# Patient Record
Sex: MALE | Race: OTHER | HISPANIC OR LATINO | Employment: OTHER | URBAN - METROPOLITAN AREA
[De-identification: names, ages, dates, MRNs, and addresses within clinical notes are randomized per-mention and may not be internally consistent; named-entity substitution may affect disease eponyms.]

---

## 2018-08-23 ENCOUNTER — APPOINTMENT (EMERGENCY)
Dept: RADIOLOGY | Facility: HOSPITAL | Age: 43
End: 2018-08-23
Payer: COMMERCIAL

## 2018-08-23 ENCOUNTER — HOSPITAL ENCOUNTER (OUTPATIENT)
Facility: HOSPITAL | Age: 43
Setting detail: OBSERVATION
Discharge: HOME | End: 2018-08-25
Attending: EMERGENCY MEDICINE | Admitting: SURGERY
Payer: COMMERCIAL

## 2018-08-23 DIAGNOSIS — S02.40DA CLOSED FRACTURE OF LEFT SIDE OF MAXILLA, INITIAL ENCOUNTER (HCC): Primary | ICD-10-CM

## 2018-08-23 DIAGNOSIS — S02.92XA CLOSED EXTENSIVE FACIAL FRACTURES (HCC): ICD-10-CM

## 2018-08-23 DIAGNOSIS — S02.40FA CLOSED FRACTURE OF LEFT ZYGOMATIC ARCH, INITIAL ENCOUNTER (HCC): ICD-10-CM

## 2018-08-23 DIAGNOSIS — S52.502A CLOSED FRACTURE OF DISTAL END OF LEFT RADIUS, UNSPECIFIED FRACTURE MORPHOLOGY, INITIAL ENCOUNTER: ICD-10-CM

## 2018-08-23 DIAGNOSIS — S62.90XA HAND FRACTURE: ICD-10-CM

## 2018-08-23 DIAGNOSIS — S62.309A CLOSED FRACTURE OF METACARPAL BONE, UNSPECIFIED FRACTURE MORPHOLOGY, UNSPECIFIED METACARPAL, UNSPECIFIED PORTION OF METACARPAL, INITIAL ENCOUNTER: ICD-10-CM
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
## 2018-08-23 DIAGNOSIS — S06.9X9A FRACTURE OF OCCIPITAL BONE OF SKULL WITH LOSS OF CONSCIOUSNESS (HCC): ICD-10-CM

## 2018-08-23 DIAGNOSIS — S02.119A FRACTURE OF OCCIPITAL BONE OF SKULL WITH LOSS OF CONSCIOUSNESS (HCC): ICD-10-CM

## 2018-08-23 LAB
ATRIAL RATE: 110 BPM
P AXIS: 64 DEGREES
PR INTERVAL: 172 MS
QRS AXIS: 66 DEGREES
QRSD INTERVAL: 84 MS
QT INTERVAL: 308 MS
QTC INTERVAL: 416 MS
T WAVE AXIS: 13 DEGREES
VENTRICULAR RATE: 110 BPM

## 2018-08-23 PROCEDURE — 71045 X-RAY EXAM CHEST 1 VIEW: CPT

## 2018-08-23 PROCEDURE — 93010 ELECTROCARDIOGRAM REPORT: CPT | Performed by: INTERNAL MEDICINE

## 2018-08-23 PROCEDURE — 96372 THER/PROPH/DIAG INJ SC/IM: CPT

## 2018-08-23 PROCEDURE — 99285 EMERGENCY DEPT VISIT HI MDM: CPT

## 2018-08-23 PROCEDURE — 93005 ELECTROCARDIOGRAM TRACING: CPT

## 2018-08-23 PROCEDURE — 72125 CT NECK SPINE W/O DYE: CPT

## 2018-08-23 PROCEDURE — 70486 CT MAXILLOFACIAL W/O DYE: CPT

## 2018-08-23 PROCEDURE — 73130 X-RAY EXAM OF HAND: CPT

## 2018-08-23 PROCEDURE — 70450 CT HEAD/BRAIN W/O DYE: CPT

## 2018-08-23 PROCEDURE — 73110 X-RAY EXAM OF WRIST: CPT

## 2018-08-23 PROCEDURE — 85049 AUTOMATED PLATELET COUNT: CPT | Performed by: ORTHOPAEDIC SURGERY

## 2018-08-23 PROCEDURE — 99219 PR INITIAL OBSERVATION CARE/DAY 50 MINUTES: CPT | Performed by: SURGERY

## 2018-08-23 PROCEDURE — 73564 X-RAY EXAM KNEE 4 OR MORE: CPT

## 2018-08-23 RX ORDER — DOCUSATE SODIUM 100 MG/1
100 CAPSULE, LIQUID FILLED ORAL 2 TIMES DAILY
Status: DISCONTINUED | OUTPATIENT
Start: 2018-08-23 | End: 2018-08-25 | Stop reason: HOSPADM

## 2018-08-23 RX ORDER — ACETAMINOPHEN 325 MG/1
650 TABLET ORAL EVERY 6 HOURS PRN
Status: DISCONTINUED | OUTPATIENT
Start: 2018-08-23 | End: 2018-08-24

## 2018-08-23 RX ORDER — KETOROLAC TROMETHAMINE 30 MG/ML
15 INJECTION, SOLUTION INTRAMUSCULAR; INTRAVENOUS ONCE
Status: COMPLETED | OUTPATIENT
Start: 2018-08-23 | End: 2018-08-23

## 2018-08-23 RX ORDER — HEPARIN SODIUM 5000 [USP'U]/ML
5000 INJECTION, SOLUTION INTRAVENOUS; SUBCUTANEOUS EVERY 8 HOURS SCHEDULED
Status: DISCONTINUED | OUTPATIENT
Start: 2018-08-23 | End: 2018-08-25 | Stop reason: HOSPADM

## 2018-08-23 RX ORDER — ONDANSETRON 2 MG/ML
4 INJECTION INTRAMUSCULAR; INTRAVENOUS EVERY 6 HOURS PRN
Status: DISCONTINUED | OUTPATIENT
Start: 2018-08-23 | End: 2018-08-25 | Stop reason: HOSPADM

## 2018-08-23 RX ORDER — DEXTROSE, SODIUM CHLORIDE, AND POTASSIUM CHLORIDE 5; .45; .15 G/100ML; G/100ML; G/100ML
100 INJECTION INTRAVENOUS CONTINUOUS
Status: DISCONTINUED | OUTPATIENT
Start: 2018-08-23 | End: 2018-08-25 | Stop reason: HOSPADM

## 2018-08-23 RX ORDER — OXYCODONE HYDROCHLORIDE 5 MG/1
5 TABLET ORAL EVERY 4 HOURS PRN
Status: DISCONTINUED | OUTPATIENT
Start: 2018-08-23 | End: 2018-08-25 | Stop reason: HOSPADM

## 2018-08-23 RX ADMIN — HEPARIN SODIUM 5000 UNITS: 5000 INJECTION, SOLUTION INTRAVENOUS; SUBCUTANEOUS at 22:00

## 2018-08-23 RX ADMIN — DOCUSATE SODIUM 100 MG: 100 CAPSULE, LIQUID FILLED ORAL at 20:38

## 2018-08-23 RX ADMIN — DEXTROSE, SODIUM CHLORIDE, AND POTASSIUM CHLORIDE 100 ML/HR: 5; .45; .15 INJECTION INTRAVENOUS at 20:41

## 2018-08-23 RX ADMIN — OXYCODONE HYDROCHLORIDE 5 MG: 5 TABLET ORAL at 20:38

## 2018-08-23 RX ADMIN — KETOROLAC TROMETHAMINE 15 MG: 30 INJECTION, SOLUTION INTRAMUSCULAR at 16:00

## 2018-08-23 NOTE — ED ATTENDING ATTESTATION
Lizzie Cardenas DO, saw and evaluated the patient  I have discussed the patient with the resident/non-physician practitioner and agree with the resident's/non-physician practitioner's findings, Plan of Care, and MDM as documented in the resident's/non-physician practitioner's note, except where noted  All available labs and Radiology studies were reviewed  At this point I agree with the current assessment done in the Emergency Department  I have conducted an independent evaluation of this patient a history and physical is as follows:    45 yo male BIBA s/p fall off ladder, c/o L wrist pain, hand pain, L sided facial pain and posterior HA  Unknown LOC  No blood thinners  Pain moderately severe, constant, no a/e factors  Denies radiation of pain, denies neck pain, focal weakness/numbness/tingling    Abrasion over L cheek  Deformity L hand/wrist     Will obtain CT head, Cspine, films L hand, wrist  Analgesia, reassess        Critical Care Time  CritCare Time    Procedures

## 2018-08-23 NOTE — ED PROVIDER NOTES
History  Chief Complaint   Patient presents with    Fall     Pt fell from ladder, 5ft up, landing on his L hand and stricking the left side of his head  Unknow LOC - thinners  c/o L wrist pain and unable to move left 3,4,5th fingers  52-year-old male presents to the emergency department for facial pain and left hand injury after a fall off of a ladder  The patient was on a 5 day for ladder painting when he fell onto his left hand  He is complaining of some pain in his left hand  He is discs located several fingers on that hand  He denies any numbness weakness or tingling in the hand  Additionally he hit the left side of his face on the pavement where he fell the he did lose consciousness  He has some left facial pain  He denies any numbness weakness tingling in his extremities  He has no neck pain  He denies any chest pain abdominal pain shortness of breath nausea or vomiting  He denies any visual changes  None       History reviewed  No pertinent past medical history  Past Surgical History:   Procedure Laterality Date    ORIF ZYGOMATIC FRACTURE Left 8/24/2018    Procedure: OPEN REDUCTION W/ INTERNAL FIXATION (ORIF) ZYGOMATIC FRACTURE;  Surgeon: Sallie Wolfe DDS;  Location: [facility];  Service: Maxillofacial       History reviewed  No pertinent family history  I have reviewed and agree with the history as documented  Social History   Substance Use Topics    Smoking status: Former Smoker    Smokeless tobacco: Never Used    Alcohol use 6 0 oz/week     10 Cans of beer per week      Comment: 2 days a month         Review of Systems   Constitutional: Negative for appetite change, chills, fatigue and fever  HENT: Positive for facial swelling  Negative for sneezing and sore throat  Eyes: Negative for visual disturbance  Respiratory: Negative for cough, choking, chest tightness, shortness of breath and wheezing  Cardiovascular: Negative for chest pain and palpitations  Gastrointestinal: Negative for abdominal pain, constipation, diarrhea, nausea and vomiting  Genitourinary: Negative for difficulty urinating and dysuria  Musculoskeletal: Positive for arthralgias  Neurological: Negative for dizziness, weakness, light-headedness, numbness and headaches  All other systems reviewed and are negative  Physical Exam  ED Triage Vitals [08/23/18 1513]   Temperature Pulse Respirations Blood Pressure SpO2   97 9 °F (36 6 °C) (!) 112 20 (!) 175/100 94 %      Temp Source Heart Rate Source Patient Position - Orthostatic VS BP Location FiO2 (%)   Oral Monitor Lying Right arm --      Pain Score       Worst Possible Pain           Orthostatic Vital Signs  Vitals:    08/24/18 1930 08/24/18 2320 08/25/18 0330 08/25/18 0735   BP: 152/79 141/81 132/72 108/63   Pulse: 99 79 71 80   Patient Position - Orthostatic VS: Lying Lying Lying Lying       Physical Exam   Constitutional: He is oriented to person, place, and time  He appears well-developed and well-nourished  No distress  HENT:   Head: Normocephalic  Mouth/Throat: Oropharynx is clear and moist    Eyes: EOM are normal  Pupils are equal, round, and reactive to light  Neck: No JVD present  No tracheal deviation present  Cardiovascular: Normal rate, regular rhythm, normal heart sounds and intact distal pulses  Exam reveals no gallop and no friction rub  No murmur heard  Pulmonary/Chest: Effort normal and breath sounds normal  No respiratory distress  He has no wheezes  He has no rales  Abdominal: Soft  Bowel sounds are normal  He exhibits no distension  There is no tenderness  There is no rebound and no guarding  Musculoskeletal:   Dislocation of the distal phalanx of the 2nd and 3rd fingers of the right hand  Neurological: He is alert and oriented to person, place, and time  No cranial nerve deficit  He exhibits normal muscle tone  Skin: Skin is warm and dry  He is not diaphoretic  No pallor     Psychiatric: He has a normal mood and affect  His behavior is normal    Nursing note and vitals reviewed  ED Medications  Medications   ketorolac (TORADOL) injection 15 mg (15 mg Intramuscular Given 8/23/18 1600)   benzocaine (HURRICAINE) 20 % mucosal spray 2 spray (2 sprays Mucosal Given 8/24/18 1930)       Diagnostic Studies  Results Reviewed     Procedure Component Value Units Date/Time    Platelet count [[id number]]  (Normal) Collected:  08/23/18 2349    Lab Status:  Final result Specimen:  Blood from Arm, Right Updated:  08/24/18 0029     Platelets 493 Thousands/uL      MPV 11 1 fL                  XR wrist 2 vw left   Final Result by Maria Eugenia Guerin MD (08/25 1013)      Fracture of the distal radius, fracture of the 2nd 3rd and 4th metacarpal with stable alignment            Workstation performed: [id number]         XR femur 2 vw left   Final Result by Maria Eugenia Guerin MD (08/25 1012)      No acute displaced fracture or dislocation seen            Workstation performed: [id number]         XR chest portable   Final Result by Isaura Taylor MD (08/24 3217)      No acute cardiopulmonary disease  Workstation performed: [id number]         XR knee 4+ vw left injury   Final Result by Isaura Taylor MD (08/24 3333)      No acute osseous abnormality  Workstation performed: [id number]         XR hand 3+ vw left   Final Result by Lana Munoz DO (08/24 0304)      Interval placement of overlying cast   Redemonstration of fractures of the distal radial metaphysis as well as the proximal 2nd, 3rd, and 4th metacarpals without significant displacement  Bone alignment is anatomic  Soft tissue swelling  Other findings as above  Workstation performed: [id number]         XR elbow 3+ vw left   Final Result by Lana Munoz DO (08/24 0258)      Overlying cast is partially visualized; no acute osseous abnormality is seen              Workstation performed: [id number]         CT head without contrast   Final Result by Inocencio Sierra MD (08/23 1615)   Addendum 1 of 1 by Inocencio Sierra MD (08/23 2205)   ADDENDUM:      In impression #2 of the original report, there is a speech recognition    error  The corrected impression should read as follows:      IMPRESSION:      1  No intracranial abnormality  2   Fractures of the left maxillary sinus and left zygomatic arch with    blood in the left maxillary sinus  I recommend further evaluation with CT    of the facial bones  3   Probable fracture of the left side of the occipital bone, limited to    the outer table with adjacent left occipital scalp hematoma  Final      1  No intracranial abnormality  2   Fractures of the left maxillary sinus and left side of the hepatic arch with blood in the left maxillary sinus  I recommend further evaluation with CT of the facial bones  3   Probable fracture of the left side of the occipital bone, limited to the outer table with adjacent left occipital scalp hematoma  Workstation performed: [id number]         XR wrist 3+ views LEFT   Final Result by Inocencio Sierra MD (08/23 2203)      Fractures of the left 2nd, 3rd and 4th metacarpals and distal left radius  Workstation performed: [id number]         CT facial bones without contrast   Final Result by Tish Christy MD (08/23 1821)      Left zygomaticomaxillary complex fracture  Workstation performed: [id number]         XR hand 3+ views LEFT   Final Result by Leonardo Jarrell MD (08/23 1629)      1  Fractures of the 2nd, 3rd and 4th metacarpals  2   Comminuted fracture of the distal radius  3  Dislocation of the 2nd proximal interphalangeal joint  Probable dislocation of the 2nd and 3rd distal interphalangeal joints  Evaluation of the digits is suboptimal due to flexion  Recommend reassessment on follow-up imaging  The study was marked in EPIC for significant notification        Workstation performed: [id number]         CT spine cervical wo contrast   Final Result by Kevin Da Silva MD (08/23 1622)       No cervical spine fracture or traumatic malalignment  Workstation performed: [id number]               Procedures  Procedures      Phone Consults  ED Phone Contact    ED Course                               MDM  Number of Diagnoses or Management Options  Closed extensive facial fractures [facility]):   Fall:   Fracture of occipital bone of skull with loss of consciousness [facility]):   Hand fracture:   Diagnosis management comments: 27-year-old male with left hand injury  The fingers are relocated at bedside without difficulty  The patient tolerated this well and the pain was greatly improved  Check x-ray of the hand, x-ray of the wrist, CT head C-spine  CritCare Time    Disposition  Final diagnoses:   Fall   Hand fracture   Closed extensive facial fractures ([facility] )   Fracture of occipital bone of skull with loss of consciousness ([facility] )     Time reflects when diagnosis was documented in both MDM as applicable and the Disposition within this note     Time User Action Codes Description Comment    8/23/2018  6:59 PM Susie Paget Add [S02 40DA] Closed fracture of left side of maxilla, initial encounter ([facility] )     8/23/2018  7:03 PM Susie Paget Add [S02 40FA] Closed fracture of left zygomatic arch, initial encounter ([facility] )     8/23/2018  7:05 PM Zhane Pageraquel Add [S62 309A] Closed fracture of metacarpal bone, unspecified fracture morphology, unspecified metacarpal, unspecified portion of metacarpal, initial encounter     8/23/2018  7:07 PM Susie Paget Add [S52 502A] Closed fracture of distal end of left radius, unspecified fracture morphology, initial encounter     8/23/2018  7:19 PM Alvaro Andrew Add [S21  XXXA] Fall     8/23/2018  7:19 PM Alvaro Andrew Add [S62 90XA] Hand fracture     8/23/2018  7:19 PM Nadeen Andrew Add [S02 92XA] Closed extensive facial fractures ([facility] )     8/23/2018  7:20 PM Lorrie Alvaro Artis [D21 000A,  D41 6J9Z] Fracture of occipital bone of skull with loss of consciousness [facility])       ED Disposition     ED Disposition Condition Comment    Admit  Case was discussed with Trauma and the patient's admission status was agreed to be Admission Status: inpatient status to the service of Dr Meggan De Jesus           Follow-up Information     Follow up With Specialties Details Why Contact Info    Emmanuelle Lundberg MD Orthopedic Surgery, Orthopedics Follow up in 1 week(s)  [address] [facility] [address]      Isaak Ramesh DDS Maxillofacial Surgery, Oral Surgery Schedule an appointment as soon as possible for a visit in 2 week(s) evaluate healing of left cheek bone/orbital fractures [address] [facility] [address]      Primary Care Provider  Schedule an appointment as soon as possible for a visit in 2 week(s)      Nat Jules MD Ophthalmology Schedule an appointment as soon as possible for a visit in 1 week(s)  [address]  [address] [address]  [phone]            Discharge Medication List as of 8/25/2018 10:30 AM      START taking these medications    Details   amoxicillin (AMOXIL) 500 mg capsule Take 1 capsule (500 mg total) by mouth every 12 (twelve) hours for 13 doses, Starting Sat 8/25/2018, Until Sat 9/1/2018, Print      chlorhexidine (PERIDEX) 0 12 % solution Apply 15 mL to the mouth or throat every 12 (twelve) hours, Starting Sat 8/25/2018, Print      guaiFENesin (MUCINEX) 600 mg 12 hr tablet Take 1 tablet (600 mg total) by mouth every 12 (twelve) hours for 27 doses, Starting Sat 8/25/2018, Until Sat 9/8/2018, Print      methocarbamol (ROBAXIN) 750 mg tablet Take 1 tablet (750 mg total) by mouth every 6 (six) hours for 23 doses, Starting Sat 8/25/2018, Until Fri 8/31/2018, Print      oxyCODONE (ROXICODONE) 5 mg immediate release tablet Take 1 tablet (5 mg total) by mouth every 4 (four) hours as needed for moderate pain for up to 10 days Max Daily Amount: 30 mg, Starting Sat 8/25/2018, Until Tue 9/4/2018, Normal             Outpatient Discharge Orders  Discharge Diet     Activity as tolerated     Lifting restrictions     No strenuous exercise     No driving until     May return to work/school on:     Call provider for:  persistent nausea or vomiting     Call provider for:  severe uncontrolled pain     Call provider for:  redness, tenderness, or signs of infection (pain, swelling, redness, odor or green/yellow discharge around incision site)     Call provider for:  difficulty breathing, headache or visual disturbances     Call provider for:  persistent dizziness or light-headedness         ED Provider  Attending physically available and evaluated Yamilexliss Vivian DAS managed the patient along with the ED Attending      Electronically Signed by         Monse Norman MD  08/27/18 6073

## 2018-08-23 NOTE — PROGRESS NOTES
Patient had a negative CT C-spine  On exam, patient was non-tender along C-spine and able to flex, extend, and rotate their head without pain  There were no distracting injuries and the patient was not intoxicated  The patients C-collar was able to be cleared      Rick Powell MD

## 2018-08-24 ENCOUNTER — ANESTHESIA EVENT (OUTPATIENT)
Dept: PERIOP | Facility: HOSPITAL | Age: 43
End: 2018-08-24
Payer: COMMERCIAL

## 2018-08-24 ENCOUNTER — APPOINTMENT (OUTPATIENT)
Dept: RADIOLOGY | Facility: HOSPITAL | Age: 43
End: 2018-08-24
Payer: COMMERCIAL

## 2018-08-24 ENCOUNTER — ANESTHESIA (OUTPATIENT)
Dept: PERIOP | Facility: HOSPITAL | Age: 43
End: 2018-08-24
Payer: COMMERCIAL

## 2018-08-24 PROBLEM — M25.562 ACUTE PAIN OF LEFT KNEE: Status: ACTIVE | Noted: 2018-08-24

## 2018-08-24 PROBLEM — S62.308A: Status: ACTIVE | Noted: 2018-08-24

## 2018-08-24 LAB
PLATELET # BLD AUTO: 247 THOUSANDS/UL (ref 149–390)
PMV BLD AUTO: 11.1 FL (ref 8.9–12.7)

## 2018-08-24 PROCEDURE — 99244 OFF/OP CNSLTJ NEW/EST MOD 40: CPT | Performed by: ORTHOPAEDIC SURGERY

## 2018-08-24 PROCEDURE — C1713 ANCHOR/SCREW BN/BN,TIS/BN: HCPCS | Performed by: DENTIST

## 2018-08-24 PROCEDURE — 73100 X-RAY EXAM OF WRIST: CPT

## 2018-08-24 PROCEDURE — 73552 X-RAY EXAM OF FEMUR 2/>: CPT

## 2018-08-24 PROCEDURE — 73080 X-RAY EXAM OF ELBOW: CPT

## 2018-08-24 PROCEDURE — 73130 X-RAY EXAM OF HAND: CPT

## 2018-08-24 DEVICE — TI MATRIXMIDFACE ORBITAL RIM PLATE/12 HOLES/0.5MM THICK
Type: IMPLANTABLE DEVICE | Site: ORBITAL FLOOR | Status: FUNCTIONAL
Brand: MATRIXMIDFACE

## 2018-08-24 DEVICE — TI MATRIXMIDFACE SCREW SELF-DRILLING 6MM
Type: IMPLANTABLE DEVICE | Site: FACE | Status: FUNCTIONAL
Brand: MATRIXMIDFACE

## 2018-08-24 DEVICE — TI MATRIXMIDFACE SCREW SELF-DRILLING 5MM
Type: IMPLANTABLE DEVICE | Site: ORBITAL FLOOR | Status: FUNCTIONAL
Brand: MATRIXMIDFACE

## 2018-08-24 RX ORDER — ACETAMINOPHEN 325 MG/1
975 TABLET ORAL EVERY 8 HOURS SCHEDULED
Status: DISCONTINUED | OUTPATIENT
Start: 2018-08-24 | End: 2018-08-25 | Stop reason: HOSPADM

## 2018-08-24 RX ORDER — ONDANSETRON 2 MG/ML
INJECTION INTRAMUSCULAR; INTRAVENOUS AS NEEDED
Status: DISCONTINUED | OUTPATIENT
Start: 2018-08-24 | End: 2018-08-24 | Stop reason: SURG

## 2018-08-24 RX ORDER — LIDOCAINE HYDROCHLORIDE AND EPINEPHRINE 10; 10 MG/ML; UG/ML
INJECTION, SOLUTION INFILTRATION; PERINEURAL AS NEEDED
Status: DISCONTINUED | OUTPATIENT
Start: 2018-08-24 | End: 2018-08-24 | Stop reason: HOSPADM

## 2018-08-24 RX ORDER — SUCCINYLCHOLINE CHLORIDE 20 MG/ML
INJECTION INTRAMUSCULAR; INTRAVENOUS AS NEEDED
Status: DISCONTINUED | OUTPATIENT
Start: 2018-08-24 | End: 2018-08-24 | Stop reason: SURG

## 2018-08-24 RX ORDER — BUPIVACAINE HYDROCHLORIDE AND EPINEPHRINE 5; 5 MG/ML; UG/ML
INJECTION, SOLUTION PERINEURAL AS NEEDED
Status: DISCONTINUED | OUTPATIENT
Start: 2018-08-24 | End: 2018-08-24 | Stop reason: HOSPADM

## 2018-08-24 RX ORDER — ONDANSETRON 2 MG/ML
4 INJECTION INTRAMUSCULAR; INTRAVENOUS EVERY 4 HOURS PRN
Status: DISCONTINUED | OUTPATIENT
Start: 2018-08-24 | End: 2018-08-24 | Stop reason: HOSPADM

## 2018-08-24 RX ORDER — KETOROLAC TROMETHAMINE 30 MG/ML
15 INJECTION, SOLUTION INTRAMUSCULAR; INTRAVENOUS EVERY 6 HOURS SCHEDULED
Status: DISCONTINUED | OUTPATIENT
Start: 2018-08-24 | End: 2018-08-25 | Stop reason: HOSPADM

## 2018-08-24 RX ORDER — METHOCARBAMOL 750 MG/1
750 TABLET, FILM COATED ORAL EVERY 6 HOURS SCHEDULED
Status: DISCONTINUED | OUTPATIENT
Start: 2018-08-24 | End: 2018-08-25 | Stop reason: HOSPADM

## 2018-08-24 RX ORDER — FENTANYL CITRATE 50 UG/ML
INJECTION, SOLUTION INTRAMUSCULAR; INTRAVENOUS AS NEEDED
Status: DISCONTINUED | OUTPATIENT
Start: 2018-08-24 | End: 2018-08-24 | Stop reason: SURG

## 2018-08-24 RX ORDER — PROPOFOL 10 MG/ML
INJECTION, EMULSION INTRAVENOUS AS NEEDED
Status: DISCONTINUED | OUTPATIENT
Start: 2018-08-24 | End: 2018-08-24 | Stop reason: SURG

## 2018-08-24 RX ORDER — BALANCED SALT SOLUTION 6.4; .75; .48; .3; 3.9; 1.7 MG/ML; MG/ML; MG/ML; MG/ML; MG/ML; MG/ML
SOLUTION OPHTHALMIC AS NEEDED
Status: DISCONTINUED | OUTPATIENT
Start: 2018-08-24 | End: 2018-08-24 | Stop reason: HOSPADM

## 2018-08-24 RX ORDER — SODIUM CHLORIDE, SODIUM LACTATE, POTASSIUM CHLORIDE, CALCIUM CHLORIDE 600; 310; 30; 20 MG/100ML; MG/100ML; MG/100ML; MG/100ML
50 INJECTION, SOLUTION INTRAVENOUS CONTINUOUS
Status: DISCONTINUED | OUTPATIENT
Start: 2018-08-24 | End: 2018-08-25 | Stop reason: HOSPADM

## 2018-08-24 RX ORDER — SENNOSIDES 8.6 MG
2 TABLET ORAL
Status: DISCONTINUED | OUTPATIENT
Start: 2018-08-24 | End: 2018-08-25 | Stop reason: HOSPADM

## 2018-08-24 RX ORDER — SODIUM CHLORIDE, SODIUM LACTATE, POTASSIUM CHLORIDE, CALCIUM CHLORIDE 600; 310; 30; 20 MG/100ML; MG/100ML; MG/100ML; MG/100ML
INJECTION, SOLUTION INTRAVENOUS CONTINUOUS PRN
Status: DISCONTINUED | OUTPATIENT
Start: 2018-08-24 | End: 2018-08-24 | Stop reason: SURG

## 2018-08-24 RX ORDER — OXYCODONE HYDROCHLORIDE 10 MG/1
10 TABLET ORAL EVERY 4 HOURS PRN
Status: DISCONTINUED | OUTPATIENT
Start: 2018-08-24 | End: 2018-08-25 | Stop reason: HOSPADM

## 2018-08-24 RX ORDER — FENTANYL CITRATE/PF 50 MCG/ML
25 SYRINGE (ML) INJECTION
Status: DISCONTINUED | OUTPATIENT
Start: 2018-08-24 | End: 2018-08-24 | Stop reason: HOSPADM

## 2018-08-24 RX ORDER — CHLORHEXIDINE GLUCONATE 0.12 MG/ML
15 RINSE ORAL EVERY 12 HOURS SCHEDULED
Status: DISCONTINUED | OUTPATIENT
Start: 2018-08-24 | End: 2018-08-25 | Stop reason: HOSPADM

## 2018-08-24 RX ADMIN — SUCCINYLCHOLINE CHLORIDE 200 MG: 20 INJECTION, SOLUTION INTRAMUSCULAR; INTRAVENOUS at 13:54

## 2018-08-24 RX ADMIN — OXYCODONE HYDROCHLORIDE 10 MG: 10 TABLET ORAL at 18:05

## 2018-08-24 RX ADMIN — DEXTROSE, SODIUM CHLORIDE, AND POTASSIUM CHLORIDE 100 ML/HR: 5; .45; .15 INJECTION INTRAVENOUS at 16:00

## 2018-08-24 RX ADMIN — DOCUSATE SODIUM 100 MG: 100 CAPSULE, LIQUID FILLED ORAL at 18:06

## 2018-08-24 RX ADMIN — ACETAMINOPHEN 975 MG: 325 TABLET, FILM COATED ORAL at 21:01

## 2018-08-24 RX ADMIN — KETOROLAC TROMETHAMINE 15 MG: 30 INJECTION, SOLUTION INTRAMUSCULAR at 18:09

## 2018-08-24 RX ADMIN — CEFAZOLIN SODIUM 2000 MG: 2 SOLUTION INTRAVENOUS at 14:02

## 2018-08-24 RX ADMIN — METHOCARBAMOL TABLETS 750 MG: 750 TABLET, COATED ORAL at 18:06

## 2018-08-24 RX ADMIN — DOCUSATE SODIUM 100 MG: 100 CAPSULE, LIQUID FILLED ORAL at 08:01

## 2018-08-24 RX ADMIN — DEXAMETHASONE SODIUM PHOSPHATE 10 MG: 10 INJECTION INTRAMUSCULAR; INTRAVENOUS at 14:08

## 2018-08-24 RX ADMIN — SODIUM CHLORIDE, SODIUM LACTATE, POTASSIUM CHLORIDE, AND CALCIUM CHLORIDE: .6; .31; .03; .02 INJECTION, SOLUTION INTRAVENOUS at 13:44

## 2018-08-24 RX ADMIN — KETOROLAC TROMETHAMINE 15 MG: 30 INJECTION, SOLUTION INTRAMUSCULAR at 07:47

## 2018-08-24 RX ADMIN — OXYCODONE HYDROCHLORIDE 5 MG: 5 TABLET ORAL at 03:58

## 2018-08-24 RX ADMIN — PROPOFOL 300 MG: 10 INJECTION, EMULSION INTRAVENOUS at 13:54

## 2018-08-24 RX ADMIN — CHLORHEXIDINE GLUCONATE 15 ML: 1.2 RINSE ORAL at 20:55

## 2018-08-24 RX ADMIN — METHOCARBAMOL TABLETS 750 MG: 750 TABLET, COATED ORAL at 11:21

## 2018-08-24 RX ADMIN — FENTANYL CITRATE 100 MCG: 50 INJECTION, SOLUTION INTRAMUSCULAR; INTRAVENOUS at 13:50

## 2018-08-24 RX ADMIN — TOPICAL ANESTHETIC 2 SPRAY: 200 SPRAY DENTAL; PERIODONTAL at 19:30

## 2018-08-24 RX ADMIN — ONDANSETRON 4 MG: 2 INJECTION INTRAMUSCULAR; INTRAVENOUS at 14:40

## 2018-08-24 RX ADMIN — SENNOSIDES 17.2 MG: 8.6 TABLET, FILM COATED ORAL at 21:02

## 2018-08-24 RX ADMIN — HEPARIN SODIUM 5000 UNITS: 5000 INJECTION, SOLUTION INTRAVENOUS; SUBCUTANEOUS at 21:00

## 2018-08-24 RX ADMIN — ACETAMINOPHEN 975 MG: 325 TABLET, FILM COATED ORAL at 07:46

## 2018-08-24 RX ADMIN — METHOCARBAMOL TABLETS 750 MG: 750 TABLET, COATED ORAL at 07:46

## 2018-08-24 NOTE — ANESTHESIA POSTPROCEDURE EVALUATION
Post-Op Assessment Note      CV Status:  Stable    Mental Status:  Alert and awake    Hydration Status:  Stable    PONV Controlled:  None    Airway Patency:  Patent    Post Op Vitals Reviewed: Yes          Staff: CRNA       Comments: hr 92, O2 sat 99%, bp 133/74, rr 14, temp 97          BP      Temp      Pulse     Resp      SpO2

## 2018-08-24 NOTE — OP NOTE
OPERATIVE REPORT  PATIENT NAME: Lena Estimable    :  1975  MRN: [id number]  Pt Location:  OR ROOM 05    SURGERY DATE: 2018    Surgeon(s) and Role: * Elle Callaway DMD - Co-surgeon     * Reema Sainz DDS - Primary    Preop Diagnosis:  Closed fracture of left side of maxilla, initial encounter ([facility] ) [S02 40DA]  Zygomatic fracture, left side, initial encounter for closed fracture [S02 40FA]  Closed fracture of lateral wall of orbit ([facility] ) [S02 80XA]    Post-Op Diagnosis Codes:     * Closed fracture of left side of maxilla, initial encounter ([facility] ) [S02 40DA]     * Zygomatic fracture, left side, initial encounter for closed fracture [S02 40FA]     * Closed fracture of lateral wall of orbit ([address] ) [S02 80XA]    Procedure(s) (LRB):  OPEN REDUCTION W/ INTERNAL FIXATION (ORIF) ZYGOMATIC FRACTURE (Left)    Specimen(s):  * No specimens in log *    Estimated Blood Loss:   Minimal    Drains:   None    Anesthesia Type:   General    Operative Indications:  Closed fracture of left side of maxilla, initial encounter ([facility] ) [S02 40DA]  Zygomatic fracture, left side, initial encounter for closed fracture [S02 40FA]  Closed fracture of lateral wall of orbit ([address] ) [S02 80XA]      Operative Findings:  Minimally displaced left zygomatic maxillary complex fractures    Complications:   None    Procedure and Technique:  Mr Baltazar Robles was greeted in the prep and hold area  All risk, benefits, alternative to procedure were discussed for open reduction internal fixation of left zygomatic maxillary complex fracture  Patient was given the opportunity to ask questions  All questions answered and patient confirmed previously signed informed consent  The patient was brought in the operating room, placed in a supine position on the operating room table, appropriate monitors placed and the anesthesia team and myself intubated patient endotracheally  The anesthesia team secured the endotracheal tube   A time out was performed with surgical, nursing, and anesthesia staff verifying patient procedure and laterality  The left face was prepared with Betadine solution and the patient was draped in the usual sterile fashion  Balanced salt solution was used to irrigate the left eye  A forced duction test was performed and extraocular movements were found to be intact  1% lidocaine 1:100,000 epinephrine was used to anesthetize left infraorbital nerve, left lateral brow skin along the planned incision site, the left posterior superior alveolar nerve, left greater palatine nerve  A 15 blade was used to make a left lateral brow incision through skin and subcutaneous tissues  A Bovie on 20:20 cut coagulation blend was used to incise the orbicularis oculi musculature and the periosteum  A periosteal elevator was used to reflect a full thickness flap and expose the fractured frontal-zygomatic suture  Manual reduction and placement of a Synthes curved plate with 5 mm screws was placed and secured Copious amounts of normal saline irrigation of the site was done  The incision was closed with 3-0 vertical mattress deep sutures and superficially with 6-0 fast gut continuous suture  Positive hemostasis was achieved  Attention was made intraorally  A Bovie on 20:20 cut coag blend was used to make a left vestibular incision through mucosa, submucosa and periosteum  A full thickness mucoperiosteal flap was reflected identifying the left piriform rim, the infraorbital nerve and the zygomatic buttress  The left zygomatic-maxillary buttress fracture was identified and manually reduced  A Synthes curved plate with 5 mm and 6 mm screws as placed and secure  Copious normal saline irrigation of the flap was done  Closure of the incisions was with 3-0 chromic gut continuous locking sutures  Positive hemostasis was achieved  The face was cleansed with normal saline solution  A forced duction test performed  Extraocular movements were intact   Balanced salt solution was used to irrigate the left eye  Sterile drapes were removed and the face cleansed with normal saline and dried  All sponge counts were correct with nursing staff  No complications encountered  I was present for the entire procedure  A qualified resident physician was not available and a co-surgeon was required because of the skills and techniques relevant to the specialty      Patient Disposition:  PACU  and extubated and stable    SIGNATURE: Efrain Myles DDS  DATE: August 24, 2018  TIME: 7:04 PM

## 2018-08-24 NOTE — PROGRESS NOTES
Progress Note - John Kay 1975, 44 y o  male MRN: [id number]    Unit/Bed#: CW3 335-01 Encounter: [id number]    Primary Care Provider: No primary care provider on file  Date and time admitted to hospital: 8/23/2018  3:09 PM        * Closed left maxillary fracture [facility])   Assessment & Plan    OMFS consulted  NPO for surgery  Pain management                Progress Note - Tertiary Trauma Survery   John Kay 44 y o  male MRN: [id number]  Unit/Bed#: CW3 335-01 Encounter: [id number]    Summary of Diagnosed Injuries: S/P Fall from ladder  Facial fractures  Left knee pain  Fractures to Left 2nd and 4th metacarpals  Left Occipital scalp hemtoma    Clinical Plan: Admit to trauma  ORtho consulted  OMFS consulted  Pain management  DVT prophylaxis  Therapy consulted    Mechanism of Injury: Fall    Transfer from: site of injury  Outside Films Received: no  Tertiary Exam Due on: 8/24/18    Vitals: Blood pressure 135/90, pulse 99, temperature 99 3 °F (37 4 °C), temperature source Oral, resp  rate 18, height 5' 6" (1 676 m), weight 95 kg (209 lb 7 oz), SpO2 98 %  ,Body mass index is 33 8 kg/m²  CT / RADIOGRAPHS: ALL RESULTS MUST BE CONFIRMED BY FACULTY OR PRINTED REPORT    CT HEAD: No intracranial abnormality  2   Fractures of the left maxillary sinus and left side of the hepatic arch with blood in the left maxillary sinus  I recommend further evaluation with CT of the facial bones    3   Probable fracture of the left side of the occipital bone, limited to the outer table with adjacent left occipital scalp hematoma        CT CHEST:    CT FACE: Left zygomaticomaxillary complex fracture     CT ABDOMEN / PELVIS:   CT CERVICAL SPINE:     No cervical spine fracture or traumatic malalignment     XR PELVIS:    CT THORACIC / LUMBAR SPINE:  CXR CHEST:    OTHER:left wrist -Fractures of the left 2nd, 3rd and 4th metacarpals and distal left radius      OTHER:    OTHER: Left hand - Interval placement of overlying cast   Redemonstration of fractures of the distal radial metaphysis as well as the proximal 2nd, 3rd, and 4th metacarpals without significant displacement  Bone alignment is anatomic      Soft tissue swelling      Other findings as above  OTHER: Left elbow - Overlying cast is partially visualized; no acute osseous abnormality is seen     OTHER: OTHER:    OTHER:  OTHER:    OTHER:  OTHER:      Consultants - List Service/ Faculty and Date: OMFS, Ortho, therapy    Active medications:           Current Facility-Administered Medications:     acetaminophen (TYLENOL) tablet 975 mg, 975 mg, Oral, Q8H [address], 975 mg at 08/24/18 0746    dextrose 5 % and sodium chloride 0 45 % with KCl 20 mEq/L infusion, 100 mL/hr, Intravenous, Continuous, 100 mL/hr at 08/23/18 2041    docusate sodium (COLACE) capsule 100 mg, 100 mg, Oral, BID, 100 mg at 08/23/18 2038    heparin (porcine) subcutaneous injection 5,000 Units, 5,000 Units, Subcutaneous, Q8H [address], 5,000 Units at 08/23/18 2200 **AND** Platelet count, , , Once    HYDROmorphone (DILAUDID) injection 1 mg, 1 mg, Intravenous, Q3H PRN    ketorolac (TORADOL) injection 15 mg, 15 mg, Intravenous, Q6H DEEP, 15 mg at 08/24/18 0747    methocarbamol (ROBAXIN) tablet 750 mg, 750 mg, Oral, Q6H DEEP, 750 mg at 08/24/18 0746    ondansetron (ZOFRAN) injection 4 mg, 4 mg, Intravenous, Q6H PRN    oxyCODONE (ROXICODONE) immediate release tablet 10 mg, 10 mg, Oral, Q4H PRN    oxyCODONE (ROXICODONE) IR tablet 5 mg, 5 mg, Oral, Q4H PRN, 5 mg at 08/24/18 0358    senna (SENOKOT) tablet 17 2 mg, 2 tablet, Oral, HS      Intake/Output Summary (Last 24 hours) at 08/24/18 0747  Last data filed at 08/24/18 6191   Gross per 24 hour   Intake                0 ml   Output              550 ml   Net             -550 ml       Invasive Devices     Peripheral Intravenous Line            Peripheral IV 08/23/18 Right Antecubital less than 1 day                CAGE-AID Questionnaire:    Was the patient able to participate in the CAGE-AID screening questions on admission? No:   1  Does the patient consume alcohol? a  NO-Patient does not consume alcohol     2  Does the patient use street drugs or abuse prescription drugs? a  NO-Patient does not use street drugs or abuse prescription drugs    Did the patient answer YES in one of the questions above? No            Is the patient 65 years or older: No    1  GCS:  GCS Total:  15, Eye Opening:   Spontaneous = 4, Motor Response: Obeys commands = 6 and Verbal Response:  Oriented = 5  2  Head:        -  normocephalic  3  Neck:        -  Non-tender, FROM  4  Chest:        -  Pain with deep inspiration       - pain over tib cage on right  5  Abdomen/Pelvis:        -  Soft, non-tender  6  Back (log roll with spinal immobilization unless cleared radiographically):        -  Non-tender  7  Extremities:        -  Sensation intact all four extremities        -  Left knee above joint on femur is tender to palpation        -  Pulses intact bilaterally  8  Peripheral Nerves:       -  Sensation intact    Do NOT use the following abbreviations: DTO, gr, Tai, MS, MSO4, MgSO4, Nitro, QD, QID, QOD, u, , ?, ?g or trailing zeros  Always use a zero before a decimal     Labs: Results for Magdy Khoury (MRN [id number]) as of 8/24/2018 07:38   Ref  Range 8/23/2018 23:49   MPV Latest Ref Range: 8 9 - 12 7 fL 11 1       Nurse / Provider rounds completed with staff nurse, Brionna Augustine and the patient  Pain regimen altered for better coverage    For OR today with OMFS

## 2018-08-24 NOTE — PROGRESS NOTES
Oral and Maxillofacial Surgery Note:    45 y/o M POD #0 s/p ORIF Left Zygomatic Maxillary Complex Fracture  Hemostatic  No complications  Left intraoral and left lateral brow incisions with resorbable sutures present      Recs:  -No further OMFS Surgical Intervention Planned   -Opthalmology c/s  -HOB 30 degrees  -Bacitracin to left lateral brow BID  -Sinus Precautions: no blowing nose, no bearing down forcefully, sneeze with mouth open  -No spitting, no straws, no suction  -Peridex 0 12% rinse BID  -Warm salt water rinses QID  -Ice to face 20 min on, 10 min off, up to 24 hours  -Diet: dental soft diet, advance to normal diet as tolerated  -Antibiotic: Amoxicillin 500 mg 1 po BID x 1 week  -Mucinex 600 mg 1 po BID x 2 week  -Follow up OMS outpatient office within 2 weeks    Tiana Wiggins DDS

## 2018-08-24 NOTE — PROGRESS NOTES
POST OP CHECK:    Patient doing well s/p ORIF Left Zygomatic Maxillary fracture  Patient says that his pain is ok  He rates it as a 5/10 in his face, throat and L arm  Denies nausea, vomiting, change in vision, or any other complaints  Vitas WNL  Physical Exam   Constitutional: He is oriented to person, place, and time  He appears well-developed and well-nourished  No distress  HENT:   Head: Normocephalic  Swelling to L side of face  Abrasion to L face    Eyes: Conjunctivae and EOM are normal  Pupils are equal, round, and reactive to light  Right eye exhibits no discharge  Left eye exhibits no discharge  Neck: Normal range of motion  Cardiovascular: Normal rate, regular rhythm, normal heart sounds and intact distal pulses  Exam reveals no gallop and no friction rub  No murmur heard  Pulmonary/Chest: Effort normal and breath sounds normal  No respiratory distress  Abdominal: Soft  Bowel sounds are normal  There is no tenderness  Musculoskeletal:   L arm in splint  Able to move fingers without difficulty     Neurological: He is alert and oriented to person, place, and time  No cranial nerve deficit or sensory deficit  He exhibits normal muscle tone  Skin: Skin is warm and dry  Psychiatric: He has a normal mood and affect  His behavior is normal    Nursing note and vitals reviewed

## 2018-08-24 NOTE — ANESTHESIA PREPROCEDURE EVALUATION
Physical Exam    Airway    Mallampati score: I  TM Distance: <3 FB  Neck ROM: full     Dental       Cardiovascular      Pulmonary      Other Findings        Anesthesia Plan  ASA Score- 1     Anesthesia Type- general with ASA Monitors  Additional Monitors:   Airway Plan:         Plan Factors-    Induction- intravenous  Postoperative Plan-     Informed Consent- Anesthetic plan and risks discussed with patient  I personally reviewed this patient with the CRNA  Discussed and agreed on the Anesthesia Plan with the CRNA  Mahin Patton

## 2018-08-24 NOTE — OCCUPATIONAL THERAPY NOTE
OCCUPATIONAL THERAPY CANCEL NOTE:    ORDERS RECEIVED  CHART REVIEW COMPLETED  PT CURRENTLY IN OR WITH OMFS  WILL FOLLOW-UP POST OP FOR COMPLETION OF OT JOSE Ford, TAURUS, OTR/L

## 2018-08-24 NOTE — DISCHARGE INSTRUCTIONS
Discharge Instructions - Orthopedics  John Kay 44 y o  male MRN: [id number]  Unit/Bed#: CW3 335-01    Weight Bearing Status:                                           Non weight bearing left arm    Pain:  Continue analgesics as directed    Dressing Instructions:   Keep dressing clean, dry and intact until follow up appointment  PT/OT:  Continue PT/OT on outpatient basis as directed    Appt Instructions: If you do not have your appointment, please call the clinic at [phone] to f/u with Dr Ruth Baker in 1 week      Contact the office sooner if you experience any increased numbness/tingling in the extremities        OMFS instruction  -HOB 30 degrees  -Bacitracin to left lateral brow BID  -Sinus Precautions: no blowing nose, no bearing down forcefully, sneeze with mouth open  -No spitting, no straws, no suction  -Peridex 0 12% rinse BID  -Warm salt water rinses QID  -Ice to face 20 min on, 10 min off, up to 24 hours  -Diet: dental soft diet, advance to normal diet as tolerated  -Antibiotic: Amoxicillin 500 mg 1 po BID x 1 week  -Mucinex 600 mg 1 po BID x 2 week  -Follow up OMS outpatient office within 2 weeks

## 2018-08-24 NOTE — ANESTHESIA PREPROCEDURE EVALUATION
Review of Systems/Medical History  Patient summary reviewed  Chart reviewed      Cardiovascular   Pulmonary       GI/Hepatic            Endo/Other     GYN       Hematology   Musculoskeletal       Neurology   Psychology           Physical Exam    Airway    Mallampati score: I  TM Distance: <3 FB  Neck ROM: full     Dental       Cardiovascular      Pulmonary      Other Findings        Anesthesia Plan  ASA Score- 1     Anesthesia Type-   Additional Monitors:   Airway Plan: ETT  Plan Factors-    Induction- intravenous  Postoperative Plan-     Informed Consent- Anesthetic plan and risks discussed with patient  I personally reviewed this patient with the CRNA  Discussed and agreed on the Anesthesia Plan with the CRNA  Augusto Romano

## 2018-08-24 NOTE — CONSULTS
Orthopedics   Juju Dy 44 y o  male MRN: [id number]  Unit/Bed#: CW3 335-01      Chief Complaint:   left wrist and hand pain    HPI:   44 y o  right hand dominant male status post Fall from ladder approximately 8 feet onto outstretched left hand with significant head trauma complaining of left wrist pain and hand pain  Pain is well localized, made worse with motion or contact to the area, and relieved by rest   Denies motor or sensory deficits to the affected hand  Denies any other major injuries, slightly tender over left lateral distal thigh proximal to knee  Review Of Systems:   · Skin: Normal  · Neuro: See HPI  · Musculoskeletal: See HPI  · 14 point review of systems negative except as stated above     Past Medical History:   History reviewed  No pertinent past medical history  Past Surgical History:   History reviewed  No pertinent surgical history  Family History:  Family history reviewed and non-contributory  History reviewed  No pertinent family history      Social History:  Social History     Social History    Marital status: /Civil Union     Spouse name: N/A    Number of children: N/A    Years of education: N/A     Social History Main Topics    Smoking status: Former Smoker    Smokeless tobacco: Never Used    Alcohol use 6 0 oz/week     10 Cans of beer per week      Comment: 2 days a month     Drug use: No      Comment: years a ago    Sexual activity: Not Asked     Other Topics Concern    None     Social History Narrative    None       Allergies:   No Known Allergies        Labs:  No results found for: HCT, HGB, PT, INR, WBC, ESR, CRP    Meds:    Current Facility-Administered Medications:     acetaminophen (TYLENOL) tablet 650 mg, 650 mg, Oral, Q6H PRN, Neela Dacosta MD    dextrose 5 % and sodium chloride 0 45 % with KCl 20 mEq/L infusion, 100 mL/hr, Intravenous, Continuous, Neela Dacosta MD, Last Rate: 100 mL/hr at 08/23/18 2041, 100 mL/hr at 08/23/18 2041    docusate sodium (COLACE) capsule 100 mg, 100 mg, Oral, BID, Demetra Oropeza MD, 100 mg at 08/23/18 2038    heparin (porcine) subcutaneous injection 5,000 Units, 5,000 Units, Subcutaneous, Q8H [address], 5,000 Units at 08/23/18 2200 **AND** Platelet count, , , Once, Demetra Oropeza MD    ondansetron [facility]) injection 4 mg, 4 mg, Intravenous, Q6H PRN, Demetra Oropeza MD    oxyCODONE (ROXICODONE) IR tablet 5 mg, 5 mg, Oral, Q4H PRN, Demetra Oropeza MD, 5 mg at 08/23/18 2038    Blood Culture:   No results found for: BLOODCX    Wound Culture:   No results found for: WOUNDCULT    Ins and Outs:  I/O last 24 hours: In: -   Out: 550 [Urine:550]          Physical Exam:   /91 (BP Location: Right arm)   Pulse 95   Temp 98 9 °F (37 2 °C) (Oral)   Resp 18   Ht 5' 6" (1 676 m)   Wt 92 4 kg (203 lb 11 3 oz)   SpO2 97%   BMI 32 88 kg/m²   Gen: Alert and oriented to person, place, time  HEENT: EOMI, eyes clear, moist mucus membranes, hearing intact  Respiratory: Bilateral chest rise  No audible wheezing found  Cardiovascular: Regular Rate and Rhythm  Abdomen: soft nontender/nondistended  Musculoskeletal: left upper extremity  · Skin intact  · No obvious deformity, moderate swelling about wrist  · Tender to palpation at 2nd, 3rd, 4th metacarpal  · Tender to palpation over forearm  · Sensation intact to median, radial, ulnar, distributions  · Motor intact to ain/pin/m/r/u  · Finger tips warm and well perfused 2+ radial pulse    Tertiary exam:  Slight tenderness noted left lateral distal thigh proximal to knee, x-ray of knee completed  No other sites of tenderness on tertiary exam    Radiology:   I personally reviewed the films    X-rays left wrist and hand shows distal radius fracture, index, long, ring finger metacarpal fractures  X-ray left hand show dislocation DIP and PIP joint of index finger, DIP dislocation of long finger    _*_*_*_*_*_*_*_*_*_*_*_*_*_*_*_*_*_*_*_*_*_*_*_*_*_*_*_*_*_*_*_*_*_*_*_*_*_*_*_*_*    Procedure: Distal radius splint application    Left hand examined him clinically noted to have relocation of DIP and PIP joints in all digits  After evaluation, pt was placed in a well padded sugartong splint  Pt tolerated the procedure well and was neurovascularly intact both pre and post procedure      Assessment:  44 y o male S/P fall from ladder with left distal radius fracture, index, long, ring finger metacarpal fractures, status post bedside reduction of PIP, and DIP dislocations of index finger and DIP dislocation of long finger    Plan:   · Non weight bearing left upper extremity in sugartong splint  · NPO at midnight  · Current plan for OR tomorrow per OMFS for facial fractures  · Ortho will follow, Will refine plan with orthopedic team    Edelmira Corral MD

## 2018-08-24 NOTE — CASE MANAGEMENT
Initial Clinical Review    Admission: Date/Time/Statement: Observation 8/23 @ 1910    Orders Placed This Encounter   Procedures    Place in Observation     Standing Status:   Standing     Number of Occurrences:   1     Order Specific Question:   Admitting Physician     Answer:   Sameul Her     Order Specific Question:   Level of Care     Answer:   Med Surg [16]       ED: Date/Time/Mode of Arrival:   ED Arrival Information     Expected Arrival Acuity Means of Arrival Escorted By Service Admission Type    - 8/23/2018 15:09 Emergent Ambulance [facility] Trauma Emergency    Arrival Complaint    Fall          Chief Complaint:   Chief Complaint   Patient presents with    Fall     Pt fell from ladder, 5ft up, landing on his L hand and stricking the left side of his head  Unknow LOC - thinners  c/o L wrist pain and unable to move left 3,4,5th fingers  History of Illness: 44 y o  male who presents as a trauma consult s/p fall from ladder  Patient fell onto his left side onto his hand and left face  Denied LOC  Patient came to the ED and imaging found fxs of the 2-4 metacarpals, comminuted fx of the distal radius, dislocation of the 2nd PIP and 2nd/3rd DIP joints, Left zygomaticomaxillary complex fx  Trauma was consulted to evaluate  Patient endorsed face and left hand/wrist pain  He also has some right chest tenderness and left knee tenderness      ED Vital Signs:   ED Triage Vitals [08/23/18 1513]   Temperature Pulse Respirations Blood Pressure SpO2   97 9 °F (36 6 °C) (!) 112 20 (!) 175/100 94 %      Temp Source Heart Rate Source Patient Position - Orthostatic VS BP Location FiO2 (%)   Oral Monitor Lying Right arm --      Pain Score       Worst Possible Pain        Wt Readings from Last 1 Encounters:   08/24/18 95 kg (209 lb 7 oz)       Vital Signs (abnormal):   08/23/18 1915  --   110  18  155/97  98 %  None (Room air)  --   08/23/18 1911  --   112  18  155/97  98 %  None (Room air)  Lying 08/23/18 1845  --   108  18  137/92  98 %  --  --   08/23/18 1802  --   110  18   163/104  98 %  None (Room air)         Abnormal Labs: WNL    Diagnostic Test Results: Xray Left Hand - 1  Fractures of the 2nd, 3rd and 4th metacarpals  2   Comminuted fracture of the distal radius  3  Dislocation of the 2nd proximal interphalangeal joint  Probable dislocation of the 2nd and 3rd distal interphalangeal joints  Evaluation of the digits is suboptimal due to flexion  Recommend reassessment on follow-up imaging      CT Facial Bones - Left zygomaticomaxillary complex fracture  ED Treatment:   Medication Administration from 08/23/2018 1508 to 08/23/2018 1953       Date/Time Order Dose Route Action     08/23/2018 1600 ketorolac (TORADOL) injection 15 mg 15 mg Intramuscular Given          Past Medical/Surgical History:    Active Ambulatory Problems     Diagnosis Date Noted    No Active Ambulatory Problems     Resolved Ambulatory Problems     Diagnosis Date Noted    No Resolved Ambulatory Problems     No Additional Past Medical History       Admitting Diagnosis: Injuries [T14 90XA]  Hand fracture [S62 90XA]  Closed extensive facial fractures ([address] ) [S02 92XA]  Fracture of occipital bone of skull with loss of consciousness ([address] ) [S02 119A, S06 9X9A]  Closed fracture of distal end of left radius, unspecified fracture morphology, initial encounter [S52 502A]  Closed fracture of metacarpal bone, unspecified fracture morphology, unspecified metacarpal, unspecified portion of metacarpal, initial encounter [S62 309A]  Closed fracture of left side of maxilla, initial encounter ([facility] [address] ) [S02 40DA]  Closed fracture of left zygomatic arch, initial encounter ([facility] [address] ) [S02 40FA]    Age/Sex: 44 y o  male    Assessment/Plan:   Trauma Alert: Evaluation  Model of Arrival: Self  Trauma Team: Attending Dr Lisseth Stout  Consultants: Trauma     Trauma Active Problems:   left and and wrist fxs, Left zygomaticomaxillary complex fx, lateral wall of the left orbit fx     Trauma Plan:   - Hand C/S  - OMFS C/S  - Pain PRN  - XR L Knee  - CXR      Admission Orders:  NPO  8/24 Tentative OR Today - OPEN REDUCTION W/ INTERNAL FIXATION (ORIF) ORBITAL (Left Face)    Oral and Maxillofacial Surgery cons  Orthopedic Surgery cons  PT/OT eval and treat    Scheduled Meds:   Current Facility-Administered Medications:  docusate sodium 100 mg Oral BID   heparin (porcine) 5,000 Units Subcutaneous Q8H [facility]     Continuous Infusions:   dextrose 5 % and sodium chloride 0 45 % with KCl 20 mEq/L 100 mL/hr Last Rate: 100 mL/hr (08/23/18 2041)     PRN Meds:   acetaminophen    ondansetron    oxyCODONE po x2    -------------------------------------------------------------------------------------------------    8/23 Oral and Maxillofacial Surgery cons:  44 y o  male s/p fall, left ZMC fracture     Plan/Recs:  OR tomorrow 7/24 for ORIF left ZMC fracture with left orbital floor reconstruction  Case boarded, consents obtained, questions answered  unasyn  peridex     ------------------------------------------------------------------------------------------    8/23 Orthopedic cons:  Assessment:  43 y o male S/P fall from ladder with left distal radius fracture, index, long, ring finger metacarpal fractures, status post bedside reduction of PIP, and DIP dislocations of index finger and DIP dislocation of long finger     Plan:   · Non weight bearing left upper extremity in sugartong splint  · NPO at midnight  · Current plan for OR tomorrow per OMFS for facial fractures  · Ortho will follow, Will refine plan with orthopedic team

## 2018-08-24 NOTE — CONSULTS
Patient Name: Immanuel Cali YOB: 1975    Medical Record No : [id number]     Admit/Registration Date: 8/23/2018  3:09 PM  Date of Consult: 08/23/18      Oral and Maxillofacial Surgery Consult Note    Assessment:  44 y o  male s/p fall, left ZMC fracture    Plan/Recs:  OR tomorrow 7/24 for ORIF left ZMC fracture with left orbital floor reconstruction  Case boarded, consents obtained, questions answered  unasyn  peridex     -----------------------------------------    Chief Complaint:  "I fell"    HPI:  44 y o  male who presents s/p fall from ladder  Denies LOC  Was scanned and noted to have a left ZMC fracture  Denies diplopia or changes to vision  Notes left V2 paresthesia  Denies malocclusion  Pre-admission records reviewed  PMH/PSH/Meds/Allergies Reviewed  History reviewed  No pertinent past medical history  History reviewed  No pertinent surgical history  No Known Allergies    Social History     Social History    Marital status: /Civil Union     Spouse name: N/A    Number of children: N/A    Years of education: N/A     Occupational History    Not on file       Social History Main Topics    Smoking status: Former Smoker    Smokeless tobacco: Never Used    Alcohol use 6 0 oz/week     10 Cans of beer per week      Comment: 2 days a month     Drug use: No      Comment: years a ago    Sexual activity: Not on file     Other Topics Concern    Not on file     Social History Narrative    No narrative on file       Scheduled Medications    Current Facility-Administered Medications:  acetaminophen 650 mg Oral Q6H PRN Christian Sanchez MD   dextrose 5 % and sodium chloride 0 45 % with KCl 20 mEq/L 100 mL/hr Intravenous Continuous Christian Sanchez MD   docusate sodium 100 mg Oral BID Christian Sanchez MD   heparin (porcine) 5,000 Units Subcutaneous [facility] Christian Sanchez MD   ondansetron 4 mg Intravenous Q6H PRN Christian Sanchez MD   oxyCODONE 5 mg Oral Q4H PRN Christian Sanchez MD       PRN Medications   acetaminophen    ondansetron    oxyCODONE    Medication Infusions    dextrose 5 % and sodium chloride 0 45 % with KCl 20 mEq/L 100 mL/hr       Review of Systems:   All systems reviewed and were negative except for: face pain     Vitals:    Temp:  [97 9 °F (36 6 °C)-98 9 °F (37 2 °C)] 98 9 °F (37 2 °C)  HR:  [] 95  Resp:  [18-20] 18  BP: (137-175)/() 161/91  Wt Readings from Last 1 Encounters:   08/23/18 92 4 kg (203 lb 11 3 oz)     Ht Readings from Last 1 Encounters:   08/23/18 5' 6" (1 676 m)     Body mass index is 32 88 kg/m²  Respiratory    Lab Data (Last 4 hours)    None         O2/Vent Data (Last 4 hours)    None              Patient Lines/Drains/Airways Status    Active Airway     None              I/O:  Current Diet Order:        Diet Orders            Start     Ordered    08/23/18 1910  Diet Regular; Regular House  Diet effective now     Question Answer Comment   Diet Type Regular    Regular Regular House    RD to adjust diet per protocol? Yes        08/23/18 1912           Intake/Output Summary (Last 24 hours) at 08/23/18 2017  Last data filed at 08/23/18 1750   Gross per 24 hour   Intake                0 ml   Output              550 ml   Net             -550 ml       Labs:                  Pain Management Panel     There is no flowsheet data to display          Imaging:   CT: reviewed, left ZMC fracture, left orbital floor fracture    Physical Exam:   General: Integment: Patient is WD/WN, Voice quality: wnl, in NAD, skin warm and dry   Neuro Exam: AAO x 3, CN V,VII grossly intact, GCS: 15  Head: Normocephalic, no scalp lacerations or hematoma   Face: No lacerations/Abrasions/Step Offs    Ears: Pinna wnl bilaterally, no otorrhea, hearing grossly intact   Eyes/Periorbital: Pupils equal, round, reactive to light and Extraocular movements intact, intercanthal distance wnl, globes soft   Nose: External nose symmetrical/no gross deformity, no nasal crepitus, no nasal septal hematoma, no rhinorrhea, no epistaxis, bilateral nares patent   Oral Exam:Lips and mucosal surfaces wnl, floor of mouth is soft with no palpable masses, tongue protrusion is midline and has full range of motion, no pharyngeal edema or exudate   Dentalalveolar Exam: Normal dentition, atraumatic and occlusion stable, JEFF wnl, lateral excursive movements wnl   Lymph/Neck Exam: Neck is soft, trachea is midline, no gross cervical lymphadenopathy bilaterally   Musculoskeletal: Neck with FROM  Cardiovascular: intact distal pulses   Respiratory: symmetric chest rise   Gastrointestinal: nondistended  Genitourinary: Defer   Extremities: No gross peripheral edema          Carrington Mayes, DMD

## 2018-08-24 NOTE — PHYSICAL THERAPY NOTE
Physical Therapy Cancellation Note        PT orders noted and chart reviewed  Plan for pt to leave unit for pending surgery today  Will continue to follow and evaluate as appropriate     Floyd Stark, PT,DPT

## 2018-08-24 NOTE — PROGRESS NOTES
Subjective: No acute events overnight  Resting upon entering room, but complains of ongoing left hand and wrist discomfort       Objective:  No results found for: WBC, HGB    Vitals:    08/24/18 0350   BP: 135/90   Pulse: 99   Resp: 18   Temp: 99 3 °F (37 4 °C)   SpO2: 98%     Left upper extremity  Splint in place, c/d/i  Motor & sensation grossly intact where visible, splinted past digits  fingertips wwp    Assessment: 43 yo M s/p fall from ladder with L distal radius fx, L index/long/ring finger metacarpal fx w/assoc multiple facial fx being managed per OMFS    Plan:  Plan for OR today with OMFS  Remain NWB GEORGIEE in splint  Pain control  PT  Ortho will follow

## 2018-08-24 NOTE — INTERIM OP NOTE
OPEN REDUCTION W/ INTERNAL FIXATION (ORIF) ZYGOMATIC FRACTURE  Postoperative Note  PATIENT NAME: Kaye Strauss  : 1975  MRN: [id number]  BE OR ROOM 05    Surgery Date: 2018    Preop Diagnosis:  Closed fracture of left side of maxilla, initial encounter ([facility] ) [S02 40DA]     * Zygomatic fracture, left side, initial encounter for closed fracture [S02 40FA]     * Closed fracture of lateral wall of orbit ([facility] ) [S02 80XA]    Post-Op Diagnosis Codes:     * Closed fracture of left side of maxilla, initial encounter ([facility] ) [S02 40DA]     * Zygomatic fracture, left side, initial encounter for closed fracture [S02 40FA]     * Closed fracture of lateral wall of orbit (HCC) [S02 80XA]    Procedure(s) (LRB):  OPEN REDUCTION W/ INTERNAL FIXATION (ORIF) ZYGOMATIC FRACTURE (Left)    Surgeon(s) and Role:      * Halie Nova DMD - Co-surgeon     * Isaak Ramesh DDS - Primary    Specimens:  * No specimens in log *    Estimated Blood Loss:   Minimal    Anesthesia Type:   General     Findings:   Minimally displaced left zygomatic maxillary complex fractures     Complications:   None    SIGNATURE: Isaak Ramesh DDS   DATE: 2018   TIME: 2:51 PM

## 2018-08-25 VITALS
RESPIRATION RATE: 16 BRPM | OXYGEN SATURATION: 97 % | HEART RATE: 80 BPM | HEIGHT: 66 IN | TEMPERATURE: 97.9 F | SYSTOLIC BLOOD PRESSURE: 108 MMHG | WEIGHT: 209.44 LBS | DIASTOLIC BLOOD PRESSURE: 63 MMHG | BODY MASS INDEX: 33.66 KG/M2

## 2018-08-25 PROCEDURE — G8989 SELF CARE D/C STATUS: HCPCS

## 2018-08-25 PROCEDURE — 99217 PR OBSERVATION CARE DISCHARGE MANAGEMENT: CPT | Performed by: SURGERY

## 2018-08-25 PROCEDURE — G8987 SELF CARE CURRENT STATUS: HCPCS

## 2018-08-25 PROCEDURE — G8988 SELF CARE GOAL STATUS: HCPCS

## 2018-08-25 PROCEDURE — G8979 MOBILITY GOAL STATUS: HCPCS

## 2018-08-25 PROCEDURE — 97167 OT EVAL HIGH COMPLEX 60 MIN: CPT

## 2018-08-25 PROCEDURE — 97163 PT EVAL HIGH COMPLEX 45 MIN: CPT

## 2018-08-25 PROCEDURE — G8978 MOBILITY CURRENT STATUS: HCPCS

## 2018-08-25 PROCEDURE — G8980 MOBILITY D/C STATUS: HCPCS

## 2018-08-25 RX ORDER — METHOCARBAMOL 750 MG/1
750 TABLET, FILM COATED ORAL EVERY 6 HOURS SCHEDULED
Qty: 23 TABLET | Refills: 0 | Status: SHIPPED | OUTPATIENT
Start: 2018-08-25 | End: 2018-08-30

## 2018-08-25 RX ORDER — OXYCODONE HYDROCHLORIDE 5 MG/1
5 TABLET ORAL EVERY 4 HOURS PRN
Qty: 20 TABLET | Refills: 0 | Status: SHIPPED | OUTPATIENT
Start: 2018-08-25 | End: 2018-09-04

## 2018-08-25 RX ORDER — AMOXICILLIN 500 MG/1
500 CAPSULE ORAL EVERY 12 HOURS SCHEDULED
Qty: 13 CAPSULE | Refills: 0 | Status: SHIPPED | OUTPATIENT
Start: 2018-08-25 | End: 2018-09-01

## 2018-08-25 RX ORDER — CHLORHEXIDINE GLUCONATE 0.12 MG/ML
15 RINSE ORAL EVERY 12 HOURS SCHEDULED
Qty: 120 ML | Refills: 0 | Status: SHIPPED | OUTPATIENT
Start: 2018-08-25 | End: 2018-09-17

## 2018-08-25 RX ORDER — GUAIFENESIN 600 MG
600 TABLET, EXTENDED RELEASE 12 HR ORAL EVERY 12 HOURS SCHEDULED
Qty: 27 TABLET | Refills: 0 | Status: SHIPPED | OUTPATIENT
Start: 2018-08-25 | End: 2018-08-30

## 2018-08-25 RX ORDER — GUAIFENESIN 600 MG
600 TABLET, EXTENDED RELEASE 12 HR ORAL EVERY 12 HOURS SCHEDULED
Status: DISCONTINUED | OUTPATIENT
Start: 2018-08-25 | End: 2018-08-25 | Stop reason: HOSPADM

## 2018-08-25 RX ORDER — AMOXICILLIN 500 MG/1
500 CAPSULE ORAL EVERY 12 HOURS SCHEDULED
Status: DISCONTINUED | OUTPATIENT
Start: 2018-08-25 | End: 2018-08-25 | Stop reason: HOSPADM

## 2018-08-25 RX ADMIN — KETOROLAC TROMETHAMINE 15 MG: 30 INJECTION, SOLUTION INTRAMUSCULAR at 05:27

## 2018-08-25 RX ADMIN — HEPARIN SODIUM 5000 UNITS: 5000 INJECTION, SOLUTION INTRAVENOUS; SUBCUTANEOUS at 05:27

## 2018-08-25 RX ADMIN — METHOCARBAMOL TABLETS 750 MG: 750 TABLET, COATED ORAL at 05:28

## 2018-08-25 RX ADMIN — GUAIFENESIN 600 MG: 600 TABLET, EXTENDED RELEASE ORAL at 09:04

## 2018-08-25 RX ADMIN — ACETAMINOPHEN 975 MG: 325 TABLET, FILM COATED ORAL at 05:28

## 2018-08-25 RX ADMIN — AMOXICILLIN 500 MG: 500 CAPSULE ORAL at 09:03

## 2018-08-25 RX ADMIN — METHOCARBAMOL TABLETS 750 MG: 750 TABLET, COATED ORAL at 00:04

## 2018-08-25 RX ADMIN — KETOROLAC TROMETHAMINE 15 MG: 30 INJECTION, SOLUTION INTRAMUSCULAR at 00:04

## 2018-08-25 RX ADMIN — SODIUM CHLORIDE, SODIUM LACTATE, POTASSIUM CHLORIDE, AND CALCIUM CHLORIDE 50 ML/HR: .6; .31; .03; .02 INJECTION, SOLUTION INTRAVENOUS at 02:16

## 2018-08-25 RX ADMIN — CHLORHEXIDINE GLUCONATE 15 ML: 1.2 RINSE ORAL at 09:04

## 2018-08-25 NOTE — ASSESSMENT & PLAN NOTE
-Orthopedics consulted, appreciate input  -neurovascular checks  - continue splint  - outpatient follow-up

## 2018-08-25 NOTE — PROGRESS NOTES
Progress Note - Reggie Kohli 1975, 44 y o  male MRN: [id number]    Unit/Bed#: CW3 335-01 Encounter: [id number]    Primary Care Provider: No primary care provider on file  Date and time admitted to hospital: 8/23/2018  3:09 PM        Acute pain of left knee   Assessment & Plan    -Neuro checks  -Sensation intact            Closed fracture of second metacarpal bone   Assessment & Plan    -Orthopedics consulted, appreciate input  -neurovascular checks  - continue splint  - outpatient follow-up        * Closed left maxillary fracture (HCC)   Assessment & Plan    -Postop day 1  -HOB 30 degrees  -Bacitracin to left lateral brow BID  -Sinus Precautions: no blowing nose, no bearing down forcefully, sneeze with mouth open  -No spitting, no straws, no suction  -Peridex 0 12% rinse BID  -Warm salt water rinses QID  -Ice to face 20 min on, 10 min off, up to 24 hours  -Diet: dental soft diet, advance to normal diet as tolerated  -Antibiotic: Amoxicillin 500 mg 1 po BID x 1 week  -Mucinex 600 mg 1 po BID x 2 week  -Follow up [facility] outpatient office within 2 weeks          DVT prophylaxis:  SCDs and subcutaneous heparin  PT and OT:  Eval and treat    Disposition: discharged home    Subjective/Objective   Chief Complaint:  No complaints    Subjective:  Patient offers no complaints today on presentation  Reports he is feeling better  Reports he is ready to go home  Denies any new visual or auditory deficits  Denies any blurry vision  Denies any double vision  Denies any fevers, chills, sweats  Objective:     Meds/Allergies   No prescriptions prior to admission  Vitals: Blood pressure 108/63, pulse 80, temperature 97 9 °F (36 6 °C), temperature source Oral, resp  rate 16, height 5' 6" (1 676 m), weight 95 kg (209 lb 7 oz), SpO2 97 %  Body mass index is 33 8 kg/m²   SpO2: SpO2: 97 %, SpO2 Device: O2 Device: None (Room air)    ABG: No results found for: PHART, ACY1MYR, PO2ART, EZA4JGX, L5SHPZKY, BEART, SOURCE      Intake/Output Summary (Last 24 hours) at 08/25/18 0920  Last data filed at 08/24/18 2320   Gross per 24 hour   Intake             1490 ml   Output                0 ml   Net             1490 ml       Invasive Devices     Peripheral Intravenous Line            Peripheral IV 08/23/18 Right Antecubital 1 day                Nutrition/GI Proph/Bowel Reg:  Continue current diet    Physical Exam:   GENERAL APPEARANCE:  No acute distress, well-appearing  HEENT:  Normocephalic, PERRLA, subconjunctival hemorrhage on the left eye; no evidence of entrapment; EOMs intact  CV:  Regular rate and rhythm  LUNGS:  CTA bilaterally  ABD:  Bowel sounds x4, nontender  EXT:  +2 pulses on extremities, splint in place on left upper extremity, neurovascularly intact  NEURO:  Cranial nerves 2-12 intact, no focal deficits  SKIN:  Warm, dry, intact    Lab Results: Results: I have personally reviewed pertinent reports   , BMP/CMP: No results found for: NA, K, CL, CO2, ANIONGAP, BUN, CREATININE, GLUCOSE, CALCIUM, AST, ALT, ALKPHOS, PROT, ALBUMIN, BILITOT, EGFR and CBC: No results found for: WBC, HGB, HCT, MCV, PLT, ADJUSTEDWBC, MCH, MCHC, RDW, MPV, NRBC  Imaging/EKG Studies: Results: I have personally reviewed pertinent reports  Other Studies:  No other studies  VTE Prophylaxis:  SCDs and subcutaneous heparin    Family updated at bedside  Nurse provider rounds completed, plan of care discussed

## 2018-08-25 NOTE — DISCHARGE SUMMARY
Discharge Summary - Trauma Service   Royal Gomez 44 y o  male MRN: [id number]  Unit/Bed#: CW3 335-01 Encounter: [id number]    Admission Date: 8/23/2018     Discharge Date: 8/25    Admitting Diagnosis: Injuries [T14 90XA]  Hand fracture [S62 90XA]  Closed extensive facial fractures ([facility] ) [S02 92XA]  Fracture of occipital bone of skull with loss of consciousness ([facility] ) [S02 119A, S06 9X9A]  Closed fracture of distal end of left radius, unspecified fracture morphology, initial encounter [S52 502A]  Closed fracture of metacarpal bone, unspecified fracture morphology, unspecified metacarpal, unspecified portion of metacarpal, initial encounter [S62 309A]  Closed fracture of left side of maxilla, initial encounter ([facility] ) [S02 40DA]  Closed fracture of left zygomatic arch, initial encounter ([facility] ) [S02 40FA]    Discharge Diagnosis:   Patient Active Problem List   Diagnosis    Closed left maxillary fracture ([facility] )    Closed fracture of second metacarpal bone    Acute pain of left knee         Attending and Service:  [facility] Trauma Service  Consulting Physician(s): Orthopedic surgery, OMFS    Imaging and Procedures Performed: No orders of the defined types were placed in this encounter  Hospital Course: Royal Gomez is a 78-year-old Male who was a trauma consult s/p fall from ladder  On imaging, patient was found to have fxs of the 2-4 metacarpals, comminuted fx of the distal radius, dislocation of the 2nd PIP and 2nd/3rd DIP joints, and L zygomaticomaxillary complex fx  Patient was taken to the OR on 8/24 by OMFS for ORIF of L ZMC fracture w/ left orbital floor construction  Patient was seen by orthopedic surgery for his extremity injuries  He was placed in a sugar tong splint and made non-weightbearing with his L upper extremity      On discharge, the patient is instructed to follow-up with the patient's primary care provider in the next one month to review the events of the patient's recent hospitalization  The patient is instructed to follow-up with Orthopedic surgery at their office and was given the contact information  The patient may resume a regular diet  The patient should follow the provided trauma service activity discharge instructions  The patient may shower daily, but should avoid tub baths or swimming for 2 weeks  The patient is instructed to call our office or return to the ER if develops a fever greater than 101, shaking chills, persistent nausea or vomiting, worsening or uncontrollable pain, and/or any increasing redness or purulent drainage from the patient's incision/wound  Condition at Discharge: fair     Discharge instructions/Information to patient and family:   See after visit summary for information provided to patient and family  Provisions for Follow-Up Care:  See after visit summary for information related to follow-up care and any pertinent home health orders  Disposition: Home    Planned Readmission: No    Discharge Statement   I spent 10 minutes discharging the patient  This time was spent on the day of discharge  I had direct contact with the patient on the day of discharge  Additional documentation is required if more than 30 minutes were spent on discharge  Discharge Medications:  See after visit summary for reconciled discharge medications provided to patient and family        Padmini Chiang DO  8/25/2018  7:29 AM

## 2018-08-25 NOTE — PHYSICAL THERAPY NOTE
Physical Therapy Evaluation    Patient's Name:Christian FIGUEROAIVRODERICKY Date:08/25/18    Patient Active Problem List   Diagnosis    Closed left maxillary fracture (HCC)    Closed fracture of second metacarpal bone    Acute pain of left knee       History reviewed  No pertinent past medical history  History reviewed  No pertinent surgical history  08/25/18 0940   Pain Assessment   Pain Assessment 0-10   Pain Score 5   Pain Type Acute pain;Surgical pain   Pain Location Arm; Face   Hospital Pain Intervention(s) Ambulation/increased activity   Response to Interventions unchanged   Home Living   Type of Home House   Home Layout Multi-level; Able to live on main level with bedroom/bathroom  (5 ALONSO w rail)   Prior Function   Level of [place] Independent with ADLs and functional mobility   Lives With Spouse   Receives Help From Family   Vocational Full time employment   Restrictions/Precautions   Weight Bearing Precautions Per Order Yes   LUE Weight Bearing Per Order NWB   General   Family/Caregiver Present Yes   Cognition   Arousal/Participation Alert   Orientation Level Oriented to person;Oriented to place;Oriented to situation   Following Commands Follows all commands and directions without difficulty   RUE Assessment   RUE Assessment WFL   LUE Assessment   LUE Assessment (NWB in splint)   RLE Assessment   RLE Assessment X   Strength RLE   RLE Overall Strength 4/5   LLE Assessment   LLE Assessment X   Strength LLE   LLE Overall Strength 4/5   Coordination   Movements are Fluid and Coordinated 0   Coordination and Movement Description LE instability   Bed Mobility   Supine to Sit 7  Independent   Sit to Supine 7  Independent   Transfers   Sit to Stand 7  Independent   Stand to Sit 7  Independent   Stand pivot 7  Independent   Ambulation/Elevation   Gait pattern Inconsistent marlena   Gait Assistance 7  Independent   Distance 260 ft   Balance   Static Standing Normal   Dynamic Standing Good   Endurance Deficit   Endurance Deficit No   Activity Tolerance   Activity Tolerance Patient tolerated treatment well   Nurse Made Aware yes   Assessment   Prognosis Excellent   Assessment pt referred to PT warants a high complexity eval 2* med complexity, abnormal labs, on neurovasc checks and presents w phys impairments s/p fall off ladder sustained maxillary fx now s/p ORIF and L distal radius fx now NWB in splintl; pt exhibits indep w transf and amb - gait pattern  w inconsistent marlena, no LOB noted on level surface, cautioned on turns and around environ obstacles; also supervised w 10 steps ascent descent using rail support alternating step pattern; pt appropriate for d/c home w fam support when med cleared, reports resideing in multilevel house w single level setup available 4 ALONSO and spouse available to assist; no further acute skilled PTs ervices indicated; DC PT   Goals   Patient Goals go home   Plan   Treatment/Interventions Functional transfer training;LE strengthening/ROM; Therapeutic exercise;Elevations; Endurance training;Cognitive reorientation;Patient/family training;Equipment eval/education; Bed mobility;Gait training; Compensatory technique education;Spoke to nursing;Family   Recommendation   Recommendation D/C PT;Home with family support   PT - OK to Discharge Yes   Barthel Index   Feeding 5   Bathing 0   Grooming Score 0   Dressing Score 5   Bladder Score 10   Bowels Score 10   Toilet Use Score 5   Transfers (Bed/Chair) Score 15   Mobility (Level Surface) Score 15   Stairs Score 10   Barthel Index Score 75

## 2018-08-25 NOTE — OCCUPATIONAL THERAPY NOTE
[address]  Evaluation     Patient Name: Kristin Dior  Today's Date: 8/25/2018  Problem List  Patient Active Problem List   Diagnosis    Closed left maxillary fracture ([address] )    Closed fracture of second metacarpal bone    Acute pain of left knee     Past Medical History  History reviewed  No pertinent past medical history  Past Surgical History  History reviewed  No pertinent surgical history  08/25/18 0920   Note Type   Note type Eval only   Restrictions/Precautions   Weight Bearing Precautions Per Order Yes   LUE Weight Bearing Per Order NWB   Pain Assessment   Pain Assessment (pt  offers no complaint)   Home Living   Type of [facility] Two level;Stairs to enter with rails   Bathroom Shower/Tub Tub/shower unit   Bathroom Toilet Standard   Bathroom Equipment (none)   [address] (none)   Prior Function   Level of [place] Independent with ADLs and functional mobility   Lives With Spouse   Receives Help From Family   ADL Assistance Independent   IADLs Independent   Falls in the last 6 months 1 to 4  (from ladder)   Vocational Full time employment   Lifestyle   Autonomy I in ADL/IADL   Reciprocal Relationships supportive wife, reports is at home and able to A pt  PRN   Service to Others full time    Intrinsic Gratification active lifestyle, working hard for his family   Psychosocial   Psychosocial (WDL) WDL   Subjective   Subjective "I can work right?"   ADL   Grooming Assistance 5  Supervision/Setup   Grooming Deficit Supervision/safety; Wash/dry hands  (standing at sink)   UB Dressing Assistance 3  Moderate Assistance   UB Dressing Deficit Setup;Verbal cueing;Supervision/safety; Increased time to complete   LB Dressing Assistance 5  Supervision/Setup   LB Dressing Deficit Supervision/safety; Don/doff R sock; Don/doff L sock   Toileting Assistance  5  Supervision/Setup   Toileting Deficit Verbal cueing;Supervison/safety; Increased time to complete   Transfers   Sit to Stand 5  Supervision   Additional items Verbal cues  (for WBS)   Stand to Sit 5  Supervision   Additional items Verbal cues  (for WBS)   Toilet transfer 5  Supervision   Additional items Verbal cues  (for WBS)   Functional Mobility   Functional Mobility 5  Supervision   Additional Comments in room and bathroom   Balance   Static Sitting Normal   Dynamic Sitting Good   Static Standing Good   Dynamic Standing Fair +   Ambulatory Fair +   Activity Tolerance   Activity Tolerance Patient tolerated treatment well   Nurse Made Aware yes   RUE Assessment   RUE Assessment WFL   LUE Assessment   LUE Assessment (NWB in splint)   Hand Function   Gross Motor Coordination Functional   Fine Motor Coordination Functional  (splint limits use of digits)   Cognition   Overall Cognitive Status WFL   Arousal/Participation Alert; Cooperative   Attention Within functional limits   Orientation Level Oriented X4   Memory Decreased recall of precautions   Following Commands Follows all commands and directions without difficulty   Assessment   Limitation Decreased ADL status; Decreased fine motor control;Decreased self-care trans;Decreased high-level ADLs  (orthopaedic restrictions)   Prognosis Good   Assessment Pt  is a 44 y o  male who was [facility] to [facility] on 8/23/2018  S/p fall from ladder  Pt sustained:extensive facial fx  (fracture of left side of maxilla and fracture of left zygomatic arch) now s/p surgical fixation, occipital bone fx, fracture of L metacarpal bones, dislocations of multiple joints in the L hand and L distal radius fx now NWB in splint  Of note, pt 's primary language is Czech adding to complexity of case  He understands and is able to speak English however complex medical terminology needs to be explained  Pt  currently lives in a [address] [place] [address] with his wife  PTA, pt  (I) in all ADLs/IADLs + FT work  Pt did not use AD for functional mobility    Currently, pt  requires (S) for functional mobility and transfers, mod A-(S) for UB ADLs and (S) for LB ADLs  A/S is needed d/t the following impairments: Decreased LUE ROM (2/2 splint), decreased [facility], orthopedic restrictions (NWB), decreased functional activity tolerance, decreased balance,slight impulsivity (language also posed barrier at times)  Additionally, pt  required min v c  For maintenance of NWB status  S/p eval, pt  Given extensive education on NWB status and functional implications of such  Pt  And wife who was present expressed understanding  Also educated pt  on doctor recommendation thathe should not return to work until he follows up as an OP  Pt  And wife expressed understanding  Additional education was given for UB dressing instructing to don affected side first and doff in reverse  Pt and wife expressed understanding  Last piece of education was to explain that he is cleared to shower from trauma perspective in respect to LUE as long at LUE is kept dry  Pt  And wife expressed understanding and wife reports she is able to assist the pt  PRN  As such, pt  Does not require further acute care OT and OT to sigh off  Recommend home with family support once medically stable     Goals   Patient Goals to learn what he can and cant to   Plan   OT Frequency Eval only   Recommendation   OT Discharge Recommendation Home with family support   OT - OK to Discharge Yes   Barthel Index   Feeding 5   Bathing 0   Grooming Score 5   Dressing Score 5   Bladder Score 10   Bowels Score 10   Toilet Use Score 10   Transfers (Bed/Chair) Score 10   Mobility (Level Surface) Score 10   Stairs Score 0  (DNT)   Barthel Index Score 65   Modified Ramu Scale   Modified Ramu Scale 3      Marleni Barriga, MOT, OTR/L

## 2018-08-25 NOTE — ASSESSMENT & PLAN NOTE
-Postop day 1  -HOB 30 degrees  -Bacitracin to left lateral brow BID  -Sinus Precautions: no blowing nose, no bearing down forcefully, sneeze with mouth open  -No spitting, no straws, no suction  -Peridex 0 12% rinse BID  -Warm salt water rinses QID  -Ice to face 20 min on, 10 min off, up to 24 hours  -Diet: dental soft diet, advance to normal diet as tolerated  -Antibiotic: Amoxicillin 500 mg 1 po BID x 1 week  -Mucinex 600 mg 1 po BID x 2 week  -Follow up [facility] outpatient office within 2 weeks

## 2018-08-28 ENCOUNTER — TELEPHONE (OUTPATIENT)
Dept: OBGYN CLINIC | Facility: CLINIC | Age: 43
End: 2018-08-28

## 2018-08-28 NOTE — TELEPHONE ENCOUNTER
Patient was seen in the ER on 8-24-18 for Fracture of the distal radius, fracture of the 2nd 3rd and 4th metacarpal with stable alignment and Dr Barb Brito recommended that he follow up with Dr Fariba Aldridge  Please review xrays and recommend f/u

## 2018-08-28 NOTE — TELEPHONE ENCOUNTER
Please schedule this patient in the office ASAP because he needs to be evaluated for surgery  Any issues, please let me know

## 2018-08-30 ENCOUNTER — APPOINTMENT (OUTPATIENT)
Dept: RADIOLOGY | Facility: CLINIC | Age: 43
End: 2018-08-30

## 2018-08-30 ENCOUNTER — OFFICE VISIT (OUTPATIENT)
Dept: OBGYN CLINIC | Facility: CLINIC | Age: 43
End: 2018-08-30

## 2018-08-30 VITALS
HEART RATE: 83 BPM | BODY MASS INDEX: 31.34 KG/M2 | DIASTOLIC BLOOD PRESSURE: 79 MMHG | HEIGHT: 66 IN | WEIGHT: 195 LBS | SYSTOLIC BLOOD PRESSURE: 117 MMHG

## 2018-08-30 DIAGNOSIS — S62.301A CLOSED NONDISPLACED FRACTURE OF SECOND METACARPAL BONE OF LEFT HAND, UNSPECIFIED PORTION OF METACARPAL, INITIAL ENCOUNTER: ICD-10-CM

## 2018-08-30 DIAGNOSIS — M25.532 LEFT WRIST PAIN: Primary | ICD-10-CM

## 2018-08-30 DIAGNOSIS — S52.602A CLOSED FRACTURE DISTAL RADIUS AND ULNA, LEFT, INITIAL ENCOUNTER: ICD-10-CM

## 2018-08-30 DIAGNOSIS — M25.532 LEFT WRIST PAIN: ICD-10-CM

## 2018-08-30 DIAGNOSIS — S52.502A CLOSED FRACTURE DISTAL RADIUS AND ULNA, LEFT, INITIAL ENCOUNTER: ICD-10-CM

## 2018-08-30 PROCEDURE — 73130 X-RAY EXAM OF HAND: CPT

## 2018-08-30 PROCEDURE — 99204 OFFICE O/P NEW MOD 45 MIN: CPT | Performed by: ORTHOPAEDIC SURGERY

## 2018-08-30 PROCEDURE — 73110 X-RAY EXAM OF WRIST: CPT

## 2018-08-30 NOTE — PROGRESS NOTES
CHIEF COMPLAINT:  Chief Complaint   Patient presents with    Left Wrist - Pain       SUBJECTIVE:  Yaquelin Park is a 44y o  year old RHD male who presents for initial evaluation in our office for left distal radius fractures and fractures of the 2nd, 3rd and 4th metacarpals, dislocation of the index finger PIP joint and dislocations of the index and long finger DIP joints  He works as a  and has not returned to work  His injury occurred on 8-23-18 when falling off a ladder  He sustained other injuries and is s/p ORIF for a left ZMC fracture of his face  The patient was seen in the hospital by Dr Keny Rangel and advised to f/u with Dr Ana Maria Seo for surgical evaluation  He had bedside reduction of PIP and DIP joint dislocations on index finger and DIP dislocation of the long finger  The patient was placed in a sugar tong splint and has been non-weight bearing to the left upper extremity  The patient is Bulgarian speaking and multiple family members are here  The patient's niece was used as a , at the request of the patient  The patient has not been talking the antibiotics or the mouth wash prescribed from his maxillofascial surgery  I advised the patient to call the surgeons office (Dr Fausto Gonzales) and the patient states he has the contact information  Denies any previous injury to the left hand or previous surgery to the left hand  The patient denies any cardiac or pulmonary issues  Denies diabetes  Denies any history of MI, gastric ulcers, kidney or liver issues  The patient does have have any health insurance  The patient denies any blood thinners  PAST MEDICAL HISTORY:  History reviewed  No pertinent past medical history      PAST SURGICAL HISTORY:  Past Surgical History:   Procedure Laterality Date    ORIF ZYGOMATIC FRACTURE Left 8/24/2018    Procedure: OPEN REDUCTION W/ INTERNAL FIXATION (ORIF) ZYGOMATIC FRACTURE;  Surgeon: Shira Giordano DDS;  [facility]: [facility]; Service: Maxillofacial       FAMILY HISTORY:  History reviewed  No pertinent family history  SOCIAL HISTORY:  Social History   Substance Use Topics    Smoking status: Former Smoker    Smokeless tobacco: Never Used    Alcohol use 6 0 oz/week     10 Cans of beer per week      Comment: 2 days a month        MEDICATIONS:    Current Outpatient Prescriptions:     oxyCODONE (ROXICODONE) 5 mg immediate release tablet, Take 1 tablet (5 mg total) by mouth every 4 (four) hours as needed for moderate pain for up to 10 days Max Daily Amount: 30 mg, Disp: 20 tablet, Rfl: 0    amoxicillin (AMOXIL) 500 mg capsule, Take 1 capsule (500 mg total) by mouth every 12 (twelve) hours for 13 doses, Disp: 13 capsule, Rfl: 0    chlorhexidine (PERIDEX) 0 12 % solution, Apply 15 mL to the mouth or throat every 12 (twelve) hours, Disp: 120 mL, Rfl: 0    ALLERGIES:  No Known Allergies    REVIEW OF SYSTEMS:  Review of Systems   Constitutional: Negative for chills, fever and unexpected weight change  HENT: Negative for hearing loss, nosebleeds and sore throat  Eyes: Positive for redness (recent fascial trauma)  Negative for pain and visual disturbance  Respiratory: Negative for cough, shortness of breath and wheezing  Cardiovascular: Negative for chest pain, palpitations and leg swelling  Gastrointestinal: Negative for abdominal pain, nausea and vomiting  Endocrine: Positive for polyuria  Negative for polydipsia  Genitourinary: Negative for dysuria and hematuria  Musculoskeletal: Positive for arthralgias, joint swelling and myalgias  Skin: Positive for wound  Negative for rash  Neurological: Positive for headaches  Negative for dizziness and numbness  Psychiatric/Behavioral: Negative for decreased concentration and suicidal ideas  The patient is not nervous/anxious          VITALS:  Vitals:    08/30/18 1246   BP: 117/79   Pulse: 83       LABS:  HgA1c: No results found for: HGBA1C  BMP: No results found for: GLUCOSE, CALCIUM, NA, K, CO2, CL, BUN, CREATININE    _____________________________________________________  PHYSICAL EXAMINATION:  General: well developed and well nourished, alert, oriented times 3 and appears comfortable  Psychiatric: Normal  HEENT: Trachea Midline, No torticollis  Cardiac:  Regular rate and rhythm  Pulmonary: No respiratory distress  Lung sounds clear to auscultation  Skin: No masses, erthema, lacerations, fluctation, ulcerations  Neurovascular: Sensation Intact to the Median, Ulnar, Radial Nerve, Motor Intact to the Median, Ulnar, Radial Nerve and Pulses Intact    MUSCULOSKELETAL EXAMINATION:  Left hand: minimal swelling about the hand and wrist   Skin is intact  Sensation intact  Fingers are stiff from immobilization  ___________________________________________________  STUDIES REVIEWED:  I personally reviewed the following images of left wrist 3 views and the left hand three views obtained today and my interpretation is left distal radius extraarticular fracture with minimal displacement and fractures at the base of the 2nd 3rd and 4th MC with minimal displacement  No dislocations  PROCEDURES PERFORMED:  Cast application  Date/Time: 8/30/2018 2:16 PM  Performed by: Kenyon Wan  Authorized by: Eli Muñoz     Consent:     Consent obtained:  Verbal    Alternatives discussed:  Alternative treatment  Pre-procedure details:     Sensation:  Normal  Procedure details:     Laterality:  Left    Location:  Hand    Hand:  L handCast type: Left full gutter cast, leaving the IPs out for motion  Supplies:  Cotton padding and fiberglass           _____________________________________________________  ASSESSMENT/PLAN:    Left distal radius fracture  * We can treat this non-surgically, however the patient would be stiff in his left hand due to his other injuries in his Metacarpals      * Non-operative treatment would include a short arm cast with MCP at 90 degrees, but allow for PIP and DIP motion  The patient would like to try non-operative treatment in a  cast, but met with our surgery scheduler today to discuss how much the surgery would cost   He will think about it and call us back if he desired surgery  Left 2nd, 3rd and 4th Metacarpal fractures    Dislocation of the left index finger PIP and DIP joints and dislocation of the long finger DIP joint  * It is very important for his fingers to start moving to prevent stiffness  Surgery with ORIF of his MC would allow for earlier ROM of the fingers, and give him the best chance to prevent stiffness  The patient voiced his understanding  He will work on ROM of the IP joints of his fingers in the last to prevent stiffness  Diagnoses and all orders for this visit:    Left wrist pain  -     XR wrist 3+ vw left; Future  -     XR hand 3+ vw left; Future    Other orders  -     Cast application        Follow Up:  Return 7-10 days [place]   To Do Next Visit:  Re-evaluation of current issue and X-rays of the  left  wrist and hand in the cast         Operative Discussions:  Distal Radius Fracture Fixation: Both operative and non operative management of the injury was explained to the patient  The surgical procedure was explained with a verbal understanding expressed by the patient  Postoperatively, the patient is to begin with occupational therapy to have a removable splint applied  This splint may be removed for showering, bathing, dining, sedative activities (eg watching tv, reading etc ) and daily therapy exercises  Radiographs are typically taken at intervals throughout the fracture healing ensure maintenance of reduction and alignment  If the fracture loses its alignment, revision surgery may be required  Medical conditions such as diabetes, osteoporosis, vitamin D deficiency, and a history of or exposure to smoking may delay or prevent fracture healing    The risks and benefits of the procedure were explained to the patient, which include, but are not limited to: Bleeding, infection, recurrence, pain, scar, malunion, nonunion, damage to tendons, damage to nerves, and damage to blood vessels, and complications related to anesthesia, failure to give desire result, need for more surgery  These risks, along with alternative conservative treatment options, and postoperative protocols were voiced back and understood by the patient  All questions were answered to the patient's satisfaction  The patient will think about his options today and call us back ASAP if he wants surgery  He met with our surgery scheduler to get an estimate of how much the surgery would cost, since the patient does not have any medical or workman's compensation coverage  Education was provided via written and auditory forms  Prior to surgery, the patient may be requested to stop all anti-inflammatory medications  Prophylactic aspirin, Plavix, and Coumadin may be allowed to be continued  Medications including vitamin E , ginkgo, and fish oil are requested to be stopped approximately one week prior to surgery  Hypertensive medications and beta blockers, if taken, should be continued      Scribe Attestation    I,:   Ellen Reza PA-C am acting as a scribe while in the presence of the attending physician :        I,:   Emmanuelle Lundberg MD personally performed the services described in this documentation    as scribed in my presence :

## 2018-09-17 ENCOUNTER — APPOINTMENT (OUTPATIENT)
Dept: RADIOLOGY | Facility: MEDICAL CENTER | Age: 43
End: 2018-09-17

## 2018-09-17 ENCOUNTER — OFFICE VISIT (OUTPATIENT)
Dept: OBGYN CLINIC | Facility: MEDICAL CENTER | Age: 43
End: 2018-09-17

## 2018-09-17 VITALS
SYSTOLIC BLOOD PRESSURE: 136 MMHG | DIASTOLIC BLOOD PRESSURE: 88 MMHG | BODY MASS INDEX: 31.34 KG/M2 | WEIGHT: 195 LBS | HEART RATE: 77 BPM | HEIGHT: 66 IN

## 2018-09-17 DIAGNOSIS — M25.532 LEFT WRIST PAIN: Primary | ICD-10-CM

## 2018-09-17 DIAGNOSIS — M25.532 LEFT WRIST PAIN: ICD-10-CM

## 2018-09-17 PROCEDURE — 73110 X-RAY EXAM OF WRIST: CPT

## 2018-09-17 PROCEDURE — 73130 X-RAY EXAM OF HAND: CPT

## 2018-09-17 PROCEDURE — 99213 OFFICE O/P EST LOW 20 MIN: CPT | Performed by: ORTHOPAEDIC SURGERY

## 2018-09-17 NOTE — PROGRESS NOTES
CHIEF COMPLAINT:  Chief Complaint   Patient presents with    Left Wrist - Follow-up       SUBJECTIVE:  Anna Flores is a 44y o  year old RHD male who presents to the office for a follow up apt regarding his left distal radius fractures and the fractures of the 2nd, 3rd and 4th metacarpals, dislocation of the index finger PIP joint and dislocations of the index and long finger DIP joints  Pt was injured on 8/23/18 when he fell off of a ladder  He is also S/P ORIF left ZMC fracture of his face  Pt was seen by dr Catha Sacks in the hospital and referred to Dr Blaze Cifuentes  Pt also had bedside reduction of the PIP and DIP joint dislocations of his index finger  And DIP dislocation on the long finger  Pt was seen in the office on 8/30/18 and decided against surgical fixation of his fractures due to the cost of the Surgery  Pt was placed in a full gutter splint  Pt is not currently experiencing pain, he continues to be NWB RUE  Pt's cast is clean dry and intact  Pt has no complaints about his cast at this time  PAST MEDICAL HISTORY:  History reviewed  No pertinent past medical history  PAST SURGICAL HISTORY:  Past Surgical History:   Procedure Laterality Date    ORIF ZYGOMATIC FRACTURE Left 8/24/2018    Procedure: OPEN REDUCTION W/ INTERNAL FIXATION (ORIF) ZYGOMATIC FRACTURE;  Surgeon: Shivani Landeros DDS;  [facility];  Service: Maxillofacial       FAMILY HISTORY:  History reviewed  No pertinent family history  SOCIAL HISTORY:  Social History   Substance Use Topics    Smoking status: Former Smoker    Smokeless tobacco: Never Used    Alcohol use 6 0 oz/week     10 Cans of beer per week      Comment: 2 days a month        MEDICATIONS:  No current outpatient prescriptions on file  ALLERGIES:  No Known Allergies    REVIEW OF SYSTEMS:  Review of Systems   Constitutional: Negative for chills, fever and unexpected weight change  HENT: Negative for hearing loss, nosebleeds and sore throat  Eyes: Negative for pain, redness and visual disturbance  Respiratory: Negative for cough, shortness of breath and wheezing  Cardiovascular: Negative for chest pain, palpitations and leg swelling  Gastrointestinal: Negative for abdominal pain, nausea and vomiting  Endocrine: Negative for polydipsia and polyuria  Genitourinary: Negative for dysuria and hematuria  Musculoskeletal: Negative for arthralgias, joint swelling and myalgias  Skin: Negative for rash and wound  Neurological: Negative for light-headedness, numbness and headaches  Psychiatric/Behavioral: Negative for decreased concentration, dysphoric mood and suicidal ideas  The patient is not nervous/anxious  VITALS:  Vitals:    09/17/18 1429   BP: 136/88   Pulse: 77       LABS:  HgA1c: No results found for: HGBA1C  BMP: No results found for: GLUCOSE, CALCIUM, NA, K, CO2, CL, BUN, CREATININE    _____________________________________________________  PHYSICAL EXAMINATION:  General: well developed and well nourished, alert, oriented times 3 and appears comfortable  Psychiatric: Normal  HEENT: Trachea Midline, No torticollis  Pulmonary: No audible wheezing or respiratory distress   Skin: No masses, erthema, lacerations, fluctation, ulcerations  Neurovascular: Sensation Intact to the Median, Ulnar, Radial Nerve, Motor Intact to the Median, Ulnar, Radial Nerve and Pulses Intact    MUSCULOSKELETAL EXAMINATION:  Left hand  Cast is clean dry and intact  No fullness noted distally      ___________________________________________________  STUDIES REVIEWED:  I personally reviewed the following images of left hand 3 views and my interpretation is no changes since last films that were taken on 8/30 and showed left distal radius extra articular fracture with minimal displacement of the fractures at the base of the 2nd 3rd and 4th MC   No further displacement, fractures are stable      PROCEDURES PERFORMED:  Procedures  No Procedures performed today    _____________________________________________________  ASSESSMENT/PLAN:    Left distal radius fracture, Left 2nd 3rd and 4ht Metacarpal fractures  * Pt will remain in short arm cast with his MCP at 90deg  *Pt will call the office if his cast gets wet or if he has any other issues with his cast  * Pt will call the office if he has any questions or concerns  *Pt will follow up in 1 week for revaluation including repeat xrays  in cast      Dislocation of the left index finger PIP and DIP joints and dislocation of the long finger DIP joint  *Pt will continue to work on ROM of fingers to prevent stiffness     Follow Up:  Return in about 1 week (around 9/24/2018)        To Do Next Visit:  Re-evaluation of current issue and X-rays of the  left  hand in cast      Scribe Attestation    I,:   Claudette Diana am acting as a scribe while in the presence of the attending physician :        I,:   Corinne Reece MD personally performed the services described in this documentation    as scribed in my presence :

## 2018-10-05 ENCOUNTER — EVALUATION (OUTPATIENT)
Dept: PHYSICAL THERAPY | Facility: CLINIC | Age: 43
End: 2018-10-05
Payer: COMMERCIAL

## 2018-10-05 ENCOUNTER — APPOINTMENT (OUTPATIENT)
Dept: RADIOLOGY | Facility: CLINIC | Age: 43
End: 2018-10-05
Payer: COMMERCIAL

## 2018-10-05 ENCOUNTER — OFFICE VISIT (OUTPATIENT)
Dept: OBGYN CLINIC | Facility: CLINIC | Age: 43
End: 2018-10-05
Payer: COMMERCIAL

## 2018-10-05 VITALS
DIASTOLIC BLOOD PRESSURE: 88 MMHG | BODY MASS INDEX: 31.34 KG/M2 | HEIGHT: 66 IN | HEART RATE: 70 BPM | WEIGHT: 195 LBS | SYSTOLIC BLOOD PRESSURE: 135 MMHG

## 2018-10-05 DIAGNOSIS — S62.301D CLOSED NONDISPLACED FRACTURE OF SECOND METACARPAL BONE OF LEFT HAND WITH ROUTINE HEALING, UNSPECIFIED PORTION OF METACARPAL, SUBSEQUENT ENCOUNTER: ICD-10-CM

## 2018-10-05 DIAGNOSIS — S62.305D CLOSED NONDISPLACED FRACTURE OF FOURTH METACARPAL BONE OF LEFT HAND WITH ROUTINE HEALING, UNSPECIFIED PORTION OF METACARPAL, SUBSEQUENT ENCOUNTER: Primary | ICD-10-CM

## 2018-10-05 DIAGNOSIS — S52.502A CLOSED FRACTURE DISTAL RADIUS AND ULNA, LEFT, INITIAL ENCOUNTER: Primary | ICD-10-CM

## 2018-10-05 DIAGNOSIS — S62.305D CLOSED NONDISPLACED FRACTURE OF FOURTH METACARPAL BONE OF LEFT HAND WITH ROUTINE HEALING, UNSPECIFIED PORTION OF METACARPAL, SUBSEQUENT ENCOUNTER: ICD-10-CM

## 2018-10-05 DIAGNOSIS — S52.602A CLOSED FRACTURE DISTAL RADIUS AND ULNA, LEFT, INITIAL ENCOUNTER: Primary | ICD-10-CM

## 2018-10-05 DIAGNOSIS — S62.303D CLOSED NONDISPLACED FRACTURE OF THIRD METACARPAL BONE OF LEFT HAND WITH ROUTINE HEALING, UNSPECIFIED PORTION OF METACARPAL, SUBSEQUENT ENCOUNTER: ICD-10-CM

## 2018-10-05 PROCEDURE — 99213 OFFICE O/P EST LOW 20 MIN: CPT | Performed by: ORTHOPAEDIC SURGERY

## 2018-10-05 PROCEDURE — G8987 SELF CARE CURRENT STATUS: HCPCS | Performed by: PHYSICAL THERAPIST

## 2018-10-05 PROCEDURE — 97161 PT EVAL LOW COMPLEX 20 MIN: CPT | Performed by: PHYSICAL THERAPIST

## 2018-10-05 PROCEDURE — 73130 X-RAY EXAM OF HAND: CPT

## 2018-10-05 PROCEDURE — G8988 SELF CARE GOAL STATUS: HCPCS | Performed by: PHYSICAL THERAPIST

## 2018-10-05 PROCEDURE — L3808 WHFO, RIGID W/O JOINTS: HCPCS | Performed by: PHYSICAL THERAPIST

## 2018-10-05 NOTE — PROGRESS NOTES
PT Evaluation  and PT Discharge    Today's date: 10/5/2018  Patient name: Holly Greene  : 1975  MRN: [id number]  Referring provider: Davina Casas MD  Dx:   Encounter Diagnosis     ICD-10-CM    1  Closed nondisplaced fracture of fourth metacarpal bone of left hand with routine healing, unspecified portion of metacarpal, subsequent encounter [id number]                   Assessment    Assessment details: Pt is a 45 YO male presenting to PT with pain, decreased AROM, strength and tolerance to activity  Pt would benefit from skilled intervention to address these issues and maximize overall function  Occupation- - pt notes his is working using his right hand  Dominant- right; Involved- left    Goals  ST  Decrease pain to 3-4 in 4 weeks            2  Decrease swelling            3  Increase AROM for composite fist            4   Provide orthotic for protection     LT  Increase functional motion and strength for independence with ADL and self care by DC            2  Ability to RTW fully and recreational activity by DC  Pt will cont therapy closer to home- goals not met        Subjective Evaluation    History of Present Illness  Date of onset: 2018  Mechanism of injury: Pt fell from a ladder onto his left UE; closed fractures to 2nd, 3rd, 4th MC, distal radius and ulna      Pain  Current pain ratin  At best pain ratin  At worst pain ratin  Location: left hand and forearm    Social Support    Employment status: working  Hand dominance: right      Diagnostic Tests  No diagnostic tests performed  Treatments  Current treatment: physical therapy  Patient Goals  Patient goals for therapy: decreased edema, decreased pain, increased motion, increased strength, independence with ADLs/IADLs, return to sport/leisure activities and return to work          Objective     General Comments     Wrist/Hand Comments  AROM left digits- pt is able to move his left thumb and IF for light pinch; other digits are restricted by splint application with MCP at 50 degrees; IPs 0 degrees extension              Wrist- splinted at 10 degrees extension  Strength- deferred  Inspection- moderate swelling throughout hand and digits; pt issued tubigrip for swelling  Sensation- intact to LT all digits  Pt fitted with a full gutter splint with MCPs at 50 degrees flexion; IPs at 0 degrees;  Pt and niece were given instructions in use and care      Flowsheet Rows      Most Recent Value   PT/OT G-Codes   Current Score  psfs-75   Projected Score  psfs- 30   FOTO information reviewed  N/A   Assessment Type  Evaluation   G code set  Self-Care   Self Care Current Status ()  CL   Self Care Goal Status ()  CJ          Precautions: no use of right hand until fractures heal    Daily Treatment Diary     Manual  10/5            [address]            tubigrip 3                                                       Exercise Diary                                                                                                                                                                                                                                                                                      Modalities

## 2018-10-05 NOTE — PROGRESS NOTES
CHIEF COMPLAINT:  Chief Complaint   Patient presents with    Left Wrist - Fracture       SUBJECTIVE:  George Saravia is a 44y o  year old RHD male who presents to the office for a follow-up appointment regarding his left distal radius fractures and the fractures of his 2nd 3rd and 4th metacarpals, dislocation of the index finger PIP joint and dislocation of index and long fingers DIP joint  Patient was injured on 08/23/2018 when he fell off of a ladder  Patient also is status post ORIF left is the [facility] fracture of his face  Patient was originally seen by Dr Alondra Alexander ago in the hospital and referred to Dr Ani Lechuga  Patient had a bedside reduction of PIP and DIP joint dislocations of his index finger and DIP dislocation of the long finger  Patient has been receiving conservative treatment due to deciding again surgical fixation due to the cost   Patient was placed in a full gutter cast on 08/30/2018  Patient presented to the office today and clean dry wall placed cast  Pt states that he has no pain, but he is having stiffness due to cast       PAST MEDICAL HISTORY:  History reviewed  No pertinent past medical history  PAST SURGICAL HISTORY:  Past Surgical History:   Procedure Laterality Date    ORIF ZYGOMATIC FRACTURE Left 8/24/2018    Procedure: OPEN REDUCTION W/ INTERNAL FIXATION (ORIF) ZYGOMATIC FRACTURE;  Surgeon: Maxi Jj DDS;  [facility];  Service: Maxillofacial       FAMILY HISTORY:  History reviewed  No pertinent family history  SOCIAL HISTORY:  Social History   Substance Use Topics    Smoking status: Former Smoker    Smokeless tobacco: Never Used    Alcohol use 6 0 oz/week     10 Cans of beer per week      Comment: 2 days a month        MEDICATIONS:  No current outpatient prescriptions on file  ALLERGIES:  No Known Allergies    REVIEW OF SYSTEMS:  Review of Systems   Constitutional: Negative for chills, fever and unexpected weight change     HENT: Negative for hearing loss, nosebleeds and sore throat  Eyes: Negative for pain, redness and visual disturbance  Respiratory: Negative for cough, shortness of breath and wheezing  Cardiovascular: Negative for chest pain, palpitations and leg swelling  Gastrointestinal: Negative for abdominal pain, nausea and vomiting  Endocrine: Negative for polydipsia and polyuria  Genitourinary: Negative for dysuria and hematuria  Musculoskeletal: Negative for arthralgias, joint swelling and myalgias  Skin: Negative for rash and wound  Neurological: Negative for light-headedness, numbness and headaches  Psychiatric/Behavioral: Negative for decreased concentration, dysphoric mood and suicidal ideas  The patient is not nervous/anxious          VITALS:  Vitals:    10/05/18 1034   BP: 135/88   Pulse: 70       LABS:  HgA1c: No results found for: HGBA1C  BMP: No results found for: GLUCOSE, CALCIUM, NA, K, CO2, CL, BUN, CREATININE    _____________________________________________________  PHYSICAL EXAMINATION:  General: well developed and well nourished, alert, oriented times 3 and appears comfortable  Psychiatric: Normal  HEENT: Trachea Midline, No torticollis  Pulmonary: No audible wheezing or respiratory distress   Skin: No masses, erthema, lacerations, fluctation, ulcerations  Neurovascular: Sensation Intact to the Median, Ulnar, Radial Nerve, Motor Intact to the Median, Ulnar, Radial Nerve and Pulses Intact    MUSCULOSKELETAL EXAMINATION:  Left hand  No swelling erythema or ecchymosis  Non tender to palpation over metacarpal fractures  Non tender to palpation over distal radius fracture   Stiffness in the MP and PIP joints of all fingers  Stiff ROM at wrist    ___________________________________________________  STUDIES REVIEWED:  I personally reviewed the following images of Three views left hand and my interpretation is Maintained stable alignment of 2nd 3rd and 4th metacarpal fractures as well as distal radius fracture      PROCEDURES PERFORMED:  Procedures  No Procedures performed today    _____________________________________________________  ASSESSMENT/PLAN:    Left distal radius fracture, left 2nd 3rd and 4th metacarpal fractures  * cast was removed today prior to x-ray  * OT order was placed for 1 time visit left full gutter custom splint fabrication  * patient will wear protective splint at all times except for excercises  * OT order was also placed for range of motion and strengthening      Dislocation of the left index finger PIP and DIP joints and dislocation of the long finger DIP joint  * patient will work on range of motion in occupational therapy        Follow Up:  Return in about 4 weeks (around 11/2/2018)        To Do Next Visit:  Re-evaluation of current issue      Scribe Attestation    I,:   Kiran Bejarano am acting as a scribe while in the presence of the attending physician :        I,:   Ruddy Campos MD personally performed the services described in this documentation    as scribed in my presence :

## 2018-11-02 VITALS — HEIGHT: 66 IN | WEIGHT: 195 LBS | BODY MASS INDEX: 31.34 KG/M2

## 2018-11-02 DIAGNOSIS — S62.301D CLOSED NONDISPLACED FRACTURE OF SECOND METACARPAL BONE OF LEFT HAND WITH ROUTINE HEALING, UNSPECIFIED PORTION OF METACARPAL, SUBSEQUENT ENCOUNTER: ICD-10-CM

## 2018-11-02 DIAGNOSIS — S52.502A CLOSED FRACTURE DISTAL RADIUS AND ULNA, LEFT, INITIAL ENCOUNTER: Primary | ICD-10-CM

## 2018-11-02 DIAGNOSIS — S52.602A CLOSED FRACTURE DISTAL RADIUS AND ULNA, LEFT, INITIAL ENCOUNTER: Primary | ICD-10-CM

## 2018-11-02 PROCEDURE — 99212 OFFICE O/P EST SF 10 MIN: CPT | Performed by: ORTHOPAEDIC SURGERY

## 2018-11-02 NOTE — LETTER
November 2, 2018     Referral [address] [place], [place] [id number]    Patient: Christi Meyers   YOB: 1975   Date of Visit: 11/2/2018       Dear Dr Ada Yo:    Thank you for referring Christi Meyers to me for evaluation  Below are the relevant portions of my assessment and plan of care  Left distal radius fracture, and fractures of the 2nd, 3rd and 4th metacarpals, dislocation of the index finger PIP joint and dislocation of the index and long finger DIP joints  * He has continued stiffness and needs to work on ROM and modalities  Strengthening as tolerated  Stop using the splint  Return to activities as tolerated  * Heat 2-3 times a day for 10 minutes would be helpful to reduce the stiffness  * May return to work light duty  No heavy lifting with the left hand greater than 5 pounds  If you have questions, please do not hesitate to call me  I look forward to following Glenny Hooks along with you           Sincerely,        Michell Bear MD        CC: No Recipients

## 2018-11-02 NOTE — PROGRESS NOTES
CHIEF COMPLAINT:  Chief Complaint   Patient presents with    Left Wrist - Follow-up       SUBJECTIVE:  Srinath Gordon is a 44y o  year old [facility] male  who presents for follow up to a left distal radius fracture, and fractures of the 2nd, 3rd and 4th metacarpals, dislocation of the index finger PIP joint and dislocation of the index and long finger DIP joints  Injury was on 8/23/2018  The patient had a bedside reduction of his dislocations  He declined surgery due to the financial cost   The patient was seen by therapy for a full left gutter custom splint  He has been wearing the splint at all time, except for exercises  He has been out of work since the injury since he is a   He has attended therapy at [facility] 3 times to work on motion  He has a lot of stiffness in the index and lnog finger MCP joints  PAST MEDICAL HISTORY:  History reviewed  No pertinent past medical history  PAST SURGICAL HISTORY:  Past Surgical History:   Procedure Laterality Date    ORIF ZYGOMATIC FRACTURE Left 8/24/2018    Procedure: OPEN REDUCTION W/ INTERNAL FIXATION (ORIF) ZYGOMATIC FRACTURE;  Surgeon: Therese Cordero DDS;  Location: [facility] OR;  Service: Maxillofacial       FAMILY HISTORY:  History reviewed  No pertinent family history  SOCIAL HISTORY:  Social History   Substance Use Topics    Smoking status: Former Smoker    Smokeless tobacco: Never Used    Alcohol use 6 0 oz/week     10 Cans of beer per week      Comment: 2 days a month        MEDICATIONS:  No current outpatient prescriptions on file  ALLERGIES:  No Known Allergies    REVIEW OF SYSTEMS:  Review of Systems   Constitutional: Negative for chills, fever and unexpected weight change  HENT: Negative for hearing loss, nosebleeds and sore throat  Eyes: Negative for pain, redness and visual disturbance  Respiratory: Negative for cough, shortness of breath and wheezing      Cardiovascular: Negative for chest pain, palpitations and leg swelling  Gastrointestinal: Negative for abdominal pain, nausea and vomiting  Endocrine: Negative for polydipsia and polyuria  Genitourinary: Negative for dysuria and hematuria  Musculoskeletal: Negative for arthralgias, joint swelling and myalgias  Skin: Negative for rash and wound  Neurological: Negative for dizziness, numbness and headaches  Psychiatric/Behavioral: Negative for decreased concentration and suicidal ideas  The patient is not nervous/anxious  VITALS:  Vitals:       LABS:  HgA1c: No results found for: HGBA1C  BMP: No results found for: GLUCOSE, CALCIUM, NA, K, CO2, CL, BUN, CREATININE    _____________________________________________________  PHYSICAL EXAMINATION:  General: well developed and well nourished, alert, oriented times 3 and appears comfortable  Psychiatric: Normal  HEENT: Trachea Midline, No torticollis  Pulmonary: No audible wheezing or respiratory distress   Skin: No masses, erthema, lacerations, fluctation, ulcerations  Neurovascular: Sensation Intact to the Median, Ulnar, Radial Nerve, Motor Intact to the Median, Ulnar, Radial Nerve and Pulses Intact    MUSCULOSKELETAL EXAMINATION:  Left hand  Mild swelling throughout the left hand  No abnormal coloration to the skin  Patient has difficulty making a fist and has stiffness to the left index and long finger MCP joint  Mildly tender to palpation on the 2nd, 3rd and 4th, MC  Nontender on the wrist       ___________________________________________________  STUDIES REVIEWED:  No studies reviewed  PROCEDURES PERFORMED:  Procedures  No Procedures performed today    _____________________________________________________  ASSESSMENT/PLAN:    Left distal radius fracture, and fractures of the 2nd, 3rd and 4th metacarpals, dislocation of the index finger PIP joint and dislocation of the index and long finger DIP joints  * He has continued stiffness and needs to work on ROM and modalities    Strengthening as tolerated  Stop using the splint  Return to activities as tolerated  * Heat 2-3 times a day for 10 minutes would be helpful to reduce the stiffness  * May return to work light duty  No heavy lifting with the left hand greater than 5 pounds      * Patient was given a note about returning to work and a note to provide to his OT (who is at an outside facility)      Follow Up:  6 weeks with xrays of the left hand      Scribe Attestation    I,:   Perry Perez PA-C am acting as a scribe while in the presence of the attending physician :        I,:   Krystina Zuniga MD personally performed the services described in this documentation    as scribed in my presence :

## 2018-11-02 NOTE — LETTER
November 2, 2018     Patient: Zeinab Ohara   YOB: 1975   Date of Visit: 11/2/2018       To Whom it May Concern:    Zeinab Ohara is under my professional care  He was seen in my office on 11/2/2018  He may return to work with limitations : May return to work light duty  No heavy lifting with the left hand greater than 5 pounds  If you have any questions or concerns, please don't hesitate to call           Sincerely,          Chase Blanca MD        CC: No Recipients

## 2018-12-21 ENCOUNTER — APPOINTMENT (OUTPATIENT)
Dept: RADIOLOGY | Facility: CLINIC | Age: 43
End: 2018-12-21

## 2018-12-21 ENCOUNTER — OFFICE VISIT (OUTPATIENT)
Dept: OBGYN CLINIC | Facility: CLINIC | Age: 43
End: 2018-12-21

## 2018-12-21 VITALS
SYSTOLIC BLOOD PRESSURE: 128 MMHG | BODY MASS INDEX: 31.34 KG/M2 | WEIGHT: 195 LBS | HEART RATE: 88 BPM | HEIGHT: 66 IN | DIASTOLIC BLOOD PRESSURE: 66 MMHG

## 2018-12-21 DIAGNOSIS — S52.502A CLOSED FRACTURE DISTAL RADIUS AND ULNA, LEFT, INITIAL ENCOUNTER: ICD-10-CM

## 2018-12-21 DIAGNOSIS — S62.305D CLOSED NONDISPLACED FRACTURE OF FOURTH METACARPAL BONE OF LEFT HAND WITH ROUTINE HEALING, UNSPECIFIED PORTION OF METACARPAL, SUBSEQUENT ENCOUNTER: ICD-10-CM

## 2018-12-21 DIAGNOSIS — S62.301D CLOSED NONDISPLACED FRACTURE OF SECOND METACARPAL BONE OF LEFT HAND WITH ROUTINE HEALING, UNSPECIFIED PORTION OF METACARPAL, SUBSEQUENT ENCOUNTER: ICD-10-CM

## 2018-12-21 DIAGNOSIS — S62.301D CLOSED NONDISPLACED FRACTURE OF SECOND METACARPAL BONE OF LEFT HAND WITH ROUTINE HEALING, UNSPECIFIED PORTION OF METACARPAL, SUBSEQUENT ENCOUNTER: Primary | ICD-10-CM

## 2018-12-21 DIAGNOSIS — S52.602A CLOSED FRACTURE DISTAL RADIUS AND ULNA, LEFT, INITIAL ENCOUNTER: ICD-10-CM

## 2018-12-21 DIAGNOSIS — S62.303D CLOSED NONDISPLACED FRACTURE OF THIRD METACARPAL BONE OF LEFT HAND WITH ROUTINE HEALING, UNSPECIFIED PORTION OF METACARPAL, SUBSEQUENT ENCOUNTER: ICD-10-CM

## 2018-12-21 PROCEDURE — 99213 OFFICE O/P EST LOW 20 MIN: CPT | Performed by: ORTHOPAEDIC SURGERY

## 2018-12-21 PROCEDURE — 73130 X-RAY EXAM OF HAND: CPT

## 2018-12-21 NOTE — PROGRESS NOTES
CHIEF COMPLAINT:  Chief Complaint   Patient presents with    Left Wrist - Follow-up       SUBJECTIVE:  Josefina Lin is a 44y o  year old RHD male who presents to the office for follow up of multiple left hand fractures and dislocations sustained on 08/23/2018 being treated conservatively  He has returned to work on [employer] duty  He occasionally experiences soreness following work  He continues to attend physical therapy to improve ROM in his index and long fingers  He denies any pain, numbness or tingling today in the office  He has no new complaints or concerns  PAST MEDICAL HISTORY:  History reviewed  No pertinent past medical history  PAST SURGICAL HISTORY:  Past Surgical History:   Procedure Laterality Date    ORIF ZYGOMATIC FRACTURE Left 8/24/2018    Procedure: OPEN REDUCTION W/ INTERNAL FIXATION (ORIF) ZYGOMATIC FRACTURE;  Surgeon: Yang Guadarrama DDS;  [facility];  Service: Maxillofacial       FAMILY HISTORY:  History reviewed  No pertinent family history  SOCIAL HISTORY:  Social History   Substance Use Topics    Smoking status: Former Smoker    Smokeless tobacco: Never Used    Alcohol use 6 0 oz/week     10 Cans of beer per week      Comment: 2 days a month        MEDICATIONS:  No current outpatient prescriptions on file  ALLERGIES:  No Known Allergies    REVIEW OF SYSTEMS:  Review of Systems   Constitutional: Negative for fever and unexpected weight change  HENT: Negative for hearing loss, nosebleeds and sore throat  Eyes: Negative for pain, redness and visual disturbance  Respiratory: Negative for cough, shortness of breath and wheezing  Cardiovascular: Negative for chest pain, palpitations and leg swelling  Gastrointestinal: Negative for abdominal pain, nausea and vomiting  Endocrine: Negative for polydipsia and polyuria  Genitourinary: Negative for dysuria and hematuria  Skin: Negative for rash and wound     Neurological: Negative for dizziness and headaches  Psychiatric/Behavioral: Negative for agitation and suicidal ideas  VITALS:  Vitals:    12/21/18 1023   BP: 128/66   Pulse: 88       LABS:  HgA1c: No results found for: HGBA1C  BMP: No results found for: GLUCOSE, CALCIUM, NA, K, CO2, CL, BUN, CREATININE    _____________________________________________________  PHYSICAL EXAMINATION:  General: well developed and well nourished, alert, oriented times 3 and appears comfortable  Psychiatric: Normal  HEENT: Trachea Midline, No torticollis  Pulmonary: No audible wheezing or respiratory distress   Skin: No masses, erthema, lacerations, fluctation, ulcerations  Neurovascular: Sensation Intact to the Median, Ulnar, Radial Nerve, Motor Intact to the Median, Ulnar, Radial Nerve and Pulses Intact    MUSCULOSKELETAL EXAMINATION:  Left hand:  Non-tender to palpation  Mild swelling index and long fingers  Full wrist ROM  Limited flexion with index and long finger  Full extension of all fingers   strength 4+/5  Sensation intact    ___________________________________________________  STUDIES REVIEWED:  Images obtained today of the left hand were reviewed and shows consolidation of fractures in acceptable alignment      PROCEDURES PERFORMED:  Procedures  No Procedures performed today    _____________________________________________________  ASSESSMENT/PLAN:    Left distal radius fracture  Left 2nd, 3rd and 4th metacarpal fractures  Left index and long finger DIP dislocations (Injury 08/23/2018)   * Continue physical therapy  * Continue activities as tolerated  * Follow up PRN      Follow Up:  Return if symptoms worsen or fail to improve          Scribe Attestation    I,:   Jaquan Lynne am acting as a scribe while in the presence of the attending physician :        I,:   Monique Roche MD personally performed the services described in this documentation    as scribed in my presence :

## 2021-01-22 NOTE — CONSULTS
Evaluation - Trauma   Mayte Eugene 44 y o  male MRN: [id number]  Unit/Bed#: ED 10 Encounter: [id number]    Assessment/Plan   Trauma Alert: Evaluation  Model of Arrival: Self  Trauma Team: Attending Dr Lisseth Stout  Consultants: Trauma    Trauma Active Problems: left and and wrist fxs, Left zygomaticomaxillary complex fx, lateral wall of the left orbit fx    Trauma Plan:   - Hand C/S  - OMFS C/S  - Pain PRN  - XR L Knee  - CXR    Chief Complaint: Dorcas Goldman off ladder  History of Present Illness   Physician Requesting Evaluation: Adriana Magaña DO  Reason for Evaluation / Principal Problem: Dorcas Goldman from ladder    HPI:  Mayte Eugene is a 44 y o  male who presents as a trauma consult s/p fall from ladder  Patient fell onto his left side onto his hand and left face  Denied LOC  Patient came to the ED and imaging found fxs of the 2-4 metacarpals, comminuted fx of the distal radius, dislocation of the 2nd PIP and 2nd/3rd DIP joints, Left zygomaticomaxillary complex fx  Trauma was consulted to evaluate  Patient endorsed face and left hand/wrist pain  He also has some right chest tenderness and left knee tenderness  Otherwise denies pain elsewhere  Consults    Review of Systems  10/14 systems reviewed and negative except those mentioned in the HPI  Historical Information   PMH: None  PSH: None    Social History   History   Alcohol Use    6 0 oz/week    10 Cans of beer per week     Comment: 2 days a month      History   Drug Use No     Comment: years a ago     History   Smoking Status    Former Smoker   Smokeless Tobacco    Never Used       There is no immunization history on file for this patient  Last Tetanus: None  Family History: Non-contributory    Meds/Allergies   current meds:   No current facility-administered medications for this encounter       and PTA meds:   None       No Known Allergies      Objective   Vitals:   First set: Temperature: 97 9 °F (36 6 °C) (08/23/18 1513)  Pulse: (!) 112 (08/23/18 1513)  Respirations: 20 (08/23/18 1513)  Blood Pressure: (!) 175/100 (08/23/18 1513)    Invasive Devices     Peripheral Intravenous Line            Peripheral IV 08/23/18 Right Antecubital less than 1 day                Neurologic Exam     Mental Status   Oriented to person, place, and time  Cranial Nerves     CN III, IV, VI   Pupils are equal, round, and reactive to light  Extraocular motions are normal      Physical Exam   Constitutional: He is oriented to person, place, and time  He appears well-developed and well-nourished  HENT:   Head: Normocephalic  Head is with abrasion  Nose: Nose normal    Mouth/Throat: Oropharynx is clear and moist    Eyes: Conjunctivae and EOM are normal  Pupils are equal, round, and reactive to light  Neck: Normal range of motion  Neck supple  No JVD present  No tracheal tenderness present  No tracheal deviation present  Cardiovascular: Normal rate and regular rhythm  Pulmonary/Chest: Effort normal and breath sounds normal  No respiratory distress  He exhibits tenderness  Abdominal: Soft  He exhibits no distension  There is no tenderness  Musculoskeletal: He exhibits no edema  Left wrist: He exhibits tenderness and swelling  Left knee: Tenderness found  Left hand: He exhibits tenderness and swelling  Neurological: He is alert and oriented to person, place, and time  Skin: Skin is warm and dry  Psychiatric: He has a normal mood and affect   His behavior is normal  Judgment and thought content normal      Lab Results: BMP/CMP: No results found for: NA, K, CL, CO2, ANIONGAP, BUN, CREATININE, GLUCOSE, CALCIUM, AST, ALT, ALKPHOS, PROT, ALBUMIN, BILITOT, EGFR, CBC: No results found for: WBC, HGB, HCT, MCV, PLT, ADJUSTEDWBC, MCH, MCHC, RDW, MPV, NRBC, Coagulation: No results found for: PT, INR, APTT, Lactate: No results found for: LACTATE, Amylase: No results found for: AMYLASE, Lipase: No results found for: LIPASE, AST: No results found for: AST, ALT: No results found for: ALT, Urinalysis: No results found for: Mirna Ego, SPECGRAV, PHUR, LEUKOCYTESUR, NITRITE, PROTEINUA, GLUCOSEU, KETONESU, BILIRUBINUR, BLOODU, CK: No results found for: CKTOTAL, Troponin: No results found for: TROPONINI, EtOH: No results found for: ETOH, UDS: No components found for: RAPIDDRUGSCREEN, Pregnancy: No results found for: PREGTESTUR, ABG: No results found for: PHART, KJC5MFW, PO2ART, TGX8JRA, K4JPVDRU, BEART, SOURCE and ISTAT: No components found for: VBG     Imaging/EKG Studies:   Procedure: Xr Hand 3+ Views Left  Result Date: 8/23/2018  Impression: 1  Fractures of the 2nd, 3rd and 4th metacarpals  2   Comminuted fracture of the distal radius  3  Dislocation of the 2nd proximal interphalangeal joint  Probable dislocation of the 2nd and 3rd distal interphalangeal joints  Evaluation of the digits is suboptimal due to flexion  Recommend reassessment on follow-up imaging  Procedure: Ct Head Without Contrast  Result Date: 8/23/2018  Impression: 1  No intracranial abnormality  2   Fractures of the left maxillary sinus and left side of the hepatic arch with blood in the left maxillary sinus  I recommend further evaluation with CT of the facial bones  3   Probable fracture of the left side of the occipital bone, limited to the outer table with adjacent left occipital scalp hematoma  Procedure: Ct Spine Cervical Wo Contrast  Result Date: 8/23/2018  Impression:  No cervical spine fracture or traumatic malalignment  Code Status: No Order  Advance Directive and Living Will:      Power of :    POLST:      Counseling / Coordination of Care  Total Critical Care time spent 30 minutes excluding procedures, teaching and family updates  Pt reports the burning sensation he experienced in his R fingers is no longer present post op; denies numbness/tingling/Grossly Intact

## (undated) DEVICE — ELECTRODE NEEDLE E-Z CLEAN 2.75IN 7CM -0013

## (undated) DEVICE — INTENDED FOR TISSUE SEPARATION, AND OTHER PROCEDURES THAT REQUIRE A SHARP SURGICAL BLADE TO PUNCTURE OR CUT.: Brand: BARD-PARKER ® CARBON RIB-BACK BLADES

## (undated) DEVICE — GLOVE INDICATOR PI UNDERGLOVE SZ 6.5 BLUE

## (undated) DEVICE — 3M™ STERI-STRIP™ REINFORCED ADHESIVE SKIN CLOSURES, R1547, 1/2 IN X 4 IN (12 MM X 100 MM), 6 STRIPS/ENVELOPE: Brand: 3M™ STERI-STRIP™

## (undated) DEVICE — SUT CHROMIC 3-0 SH 27 IN G122H

## (undated) DEVICE — SUT PLAIN 6-0 PC-1 18 IN 1916G

## (undated) DEVICE — BATTERY PACK-STERILE FOR BATTERY POWERED DRIVER

## (undated) DEVICE — 3M™ STERI-STRIP™ REINFORCED ADHESIVE SKIN CLOSURES, R1542, 1/4 IN X 1-1/2 IN (6 MM X 38 MM), 6 STRIPS/ENVELOPE: Brand: 3M™ STERI-STRIP™

## (undated) DEVICE — 3000CC GUARDIAN II: Brand: GUARDIAN

## (undated) DEVICE — PENCIL ELECTROSURG E-Z CLEAN -0035H

## (undated) DEVICE — NEEDLE 25G X 1 1/2

## (undated) DEVICE — SYRINGE BULB 2 OZ

## (undated) DEVICE — GLOVE SRG BIOGEL 6.5

## (undated) DEVICE — SYRINGE 10ML LL

## (undated) DEVICE — DRAPE SURGIKIT SADDLE BAG

## (undated) DEVICE — PACK PBDS PLASTIC HEAD AND NECK RF

## (undated) DEVICE — SUT SILK 5-0 P-3 18 IN 640G

## (undated) DEVICE — SUT CHROMIC 4-0 RB-1 27 IN U203H

## (undated) DEVICE — OCULAR CONFORMER - NON VENTED - MEDIUM: Brand: MEDPOR